# Patient Record
Sex: MALE | Race: WHITE | Employment: OTHER | URBAN - METROPOLITAN AREA
[De-identification: names, ages, dates, MRNs, and addresses within clinical notes are randomized per-mention and may not be internally consistent; named-entity substitution may affect disease eponyms.]

---

## 2017-01-01 ENCOUNTER — NURSING HOME VISIT (OUTPATIENT)
Dept: GERIATRICS | Facility: CLINIC | Age: 82
End: 2017-01-01
Payer: COMMERCIAL

## 2017-01-01 ENCOUNTER — TRANSFERRED RECORDS (OUTPATIENT)
Dept: HEALTH INFORMATION MANAGEMENT | Facility: CLINIC | Age: 82
End: 2017-01-01

## 2017-01-01 ENCOUNTER — HOSPITAL LABORATORY (OUTPATIENT)
Facility: OTHER | Age: 82
End: 2017-01-01

## 2017-01-01 ENCOUNTER — DOCUMENTATION ONLY (OUTPATIENT)
Dept: OTHER | Facility: CLINIC | Age: 82
End: 2017-01-01

## 2017-01-01 ENCOUNTER — CLINICAL UPDATE (OUTPATIENT)
Dept: PHARMACY | Facility: CLINIC | Age: 82
End: 2017-01-01

## 2017-01-01 VITALS
HEART RATE: 65 BPM | OXYGEN SATURATION: 95 % | TEMPERATURE: 97.4 F | RESPIRATION RATE: 18 BRPM | DIASTOLIC BLOOD PRESSURE: 56 MMHG | SYSTOLIC BLOOD PRESSURE: 98 MMHG | BODY MASS INDEX: 17.7 KG/M2 | WEIGHT: 113 LBS

## 2017-01-01 VITALS
RESPIRATION RATE: 16 BRPM | SYSTOLIC BLOOD PRESSURE: 114 MMHG | OXYGEN SATURATION: 97 % | TEMPERATURE: 98.4 F | BODY MASS INDEX: 17.42 KG/M2 | HEART RATE: 69 BPM | DIASTOLIC BLOOD PRESSURE: 66 MMHG | HEIGHT: 67 IN | WEIGHT: 111 LBS

## 2017-01-01 VITALS
WEIGHT: 114 LBS | OXYGEN SATURATION: 95 % | DIASTOLIC BLOOD PRESSURE: 62 MMHG | RESPIRATION RATE: 17 BRPM | TEMPERATURE: 97.2 F | HEIGHT: 67 IN | BODY MASS INDEX: 17.89 KG/M2 | HEART RATE: 66 BPM | SYSTOLIC BLOOD PRESSURE: 105 MMHG

## 2017-01-01 VITALS
HEART RATE: 76 BPM | SYSTOLIC BLOOD PRESSURE: 168 MMHG | BODY MASS INDEX: 18.05 KG/M2 | RESPIRATION RATE: 18 BRPM | HEIGHT: 67 IN | DIASTOLIC BLOOD PRESSURE: 75 MMHG | OXYGEN SATURATION: 95 % | TEMPERATURE: 98.6 F | WEIGHT: 115 LBS

## 2017-01-01 VITALS
HEART RATE: 74 BPM | WEIGHT: 113.8 LBS | TEMPERATURE: 97.6 F | BODY MASS INDEX: 17.82 KG/M2 | SYSTOLIC BLOOD PRESSURE: 124 MMHG | RESPIRATION RATE: 18 BRPM | DIASTOLIC BLOOD PRESSURE: 68 MMHG | OXYGEN SATURATION: 97 %

## 2017-01-01 VITALS
WEIGHT: 113 LBS | HEART RATE: 69 BPM | TEMPERATURE: 97.6 F | SYSTOLIC BLOOD PRESSURE: 124 MMHG | BODY MASS INDEX: 17.7 KG/M2 | RESPIRATION RATE: 18 BRPM | DIASTOLIC BLOOD PRESSURE: 68 MMHG | OXYGEN SATURATION: 97 %

## 2017-01-01 VITALS
BODY MASS INDEX: 26.06 KG/M2 | TEMPERATURE: 97.7 F | SYSTOLIC BLOOD PRESSURE: 114 MMHG | OXYGEN SATURATION: 95 % | DIASTOLIC BLOOD PRESSURE: 64 MMHG | RESPIRATION RATE: 17 BRPM | WEIGHT: 166 LBS | HEIGHT: 67 IN | HEART RATE: 74 BPM

## 2017-01-01 VITALS
BODY MASS INDEX: 16.95 KG/M2 | DIASTOLIC BLOOD PRESSURE: 73 MMHG | HEIGHT: 67 IN | HEART RATE: 66 BPM | WEIGHT: 108 LBS | SYSTOLIC BLOOD PRESSURE: 116 MMHG | OXYGEN SATURATION: 95 % | RESPIRATION RATE: 17 BRPM | TEMPERATURE: 98.7 F

## 2017-01-01 VITALS
HEART RATE: 70 BPM | BODY MASS INDEX: 17.85 KG/M2 | TEMPERATURE: 97 F | WEIGHT: 114 LBS | DIASTOLIC BLOOD PRESSURE: 65 MMHG | OXYGEN SATURATION: 95 % | RESPIRATION RATE: 18 BRPM | SYSTOLIC BLOOD PRESSURE: 117 MMHG

## 2017-01-01 VITALS
OXYGEN SATURATION: 95 % | HEART RATE: 64 BPM | SYSTOLIC BLOOD PRESSURE: 132 MMHG | DIASTOLIC BLOOD PRESSURE: 88 MMHG | BODY MASS INDEX: 17.27 KG/M2 | TEMPERATURE: 97.8 F | WEIGHT: 110 LBS | HEIGHT: 67 IN | RESPIRATION RATE: 20 BRPM

## 2017-01-01 VITALS
HEIGHT: 67 IN | WEIGHT: 112 LBS | RESPIRATION RATE: 13 BRPM | BODY MASS INDEX: 17.58 KG/M2 | HEART RATE: 63 BPM | TEMPERATURE: 96.5 F | SYSTOLIC BLOOD PRESSURE: 146 MMHG | DIASTOLIC BLOOD PRESSURE: 84 MMHG | OXYGEN SATURATION: 95 %

## 2017-01-01 VITALS
DIASTOLIC BLOOD PRESSURE: 68 MMHG | HEART RATE: 61 BPM | WEIGHT: 115 LBS | RESPIRATION RATE: 18 BRPM | OXYGEN SATURATION: 95 % | TEMPERATURE: 97.3 F | SYSTOLIC BLOOD PRESSURE: 132 MMHG | HEIGHT: 67 IN | BODY MASS INDEX: 18.05 KG/M2

## 2017-01-01 VITALS
DIASTOLIC BLOOD PRESSURE: 59 MMHG | WEIGHT: 111 LBS | HEART RATE: 79 BPM | TEMPERATURE: 96.3 F | OXYGEN SATURATION: 95 % | SYSTOLIC BLOOD PRESSURE: 96 MMHG | RESPIRATION RATE: 18 BRPM | BODY MASS INDEX: 17.39 KG/M2

## 2017-01-01 DIAGNOSIS — R54 FRAIL ELDERLY: ICD-10-CM

## 2017-01-01 DIAGNOSIS — I63.89 CEREBROVASCULAR ACCIDENT (CVA) DUE TO OTHER MECHANISM (H): Primary | ICD-10-CM

## 2017-01-01 DIAGNOSIS — G12.29 ANTERIOR HORN CELL DISEASE (H): ICD-10-CM

## 2017-01-01 DIAGNOSIS — G62.9 NEUROPATHY: ICD-10-CM

## 2017-01-01 DIAGNOSIS — G12.29 ANTERIOR HORN CELL DISEASE (H): Primary | ICD-10-CM

## 2017-01-01 DIAGNOSIS — R26.2 DIFFICULTY WALKING: ICD-10-CM

## 2017-01-01 DIAGNOSIS — R41.3 MEMORY LOSS: ICD-10-CM

## 2017-01-01 DIAGNOSIS — E46 MALNUTRITION, UNSPECIFIED TYPE (H): ICD-10-CM

## 2017-01-01 DIAGNOSIS — R54 FRAILTY: ICD-10-CM
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
## 2017-01-01 DIAGNOSIS — I10 ESSENTIAL HYPERTENSION: ICD-10-CM

## 2017-01-01 DIAGNOSIS — R52 PAIN: ICD-10-CM

## 2017-01-01 DIAGNOSIS — Z71.89 ADVANCED DIRECTIVES, COUNSELING/DISCUSSION: Chronic | ICD-10-CM

## 2017-01-01 DIAGNOSIS — E46 PROTEIN-CALORIE MALNUTRITION (H): ICD-10-CM

## 2017-01-01 DIAGNOSIS — M25.511 RIGHT SHOULDER PAIN, UNSPECIFIED CHRONICITY: ICD-10-CM

## 2017-01-01 DIAGNOSIS — T78.40XA ALLERGY, INITIAL ENCOUNTER: ICD-10-CM

## 2017-01-01 DIAGNOSIS — I63.89 CEREBROVASCULAR ACCIDENT (CVA) DUE TO OTHER MECHANISM (H): ICD-10-CM

## 2017-01-01 DIAGNOSIS — R13.10 DYSPHAGIA, UNSPECIFIED TYPE: ICD-10-CM

## 2017-01-01 DIAGNOSIS — J69.0 ASPIRATION PNEUMONIA OF RIGHT MIDDLE LOBE, UNSPECIFIED ASPIRATION PNEUMONIA TYPE (H): ICD-10-CM

## 2017-01-01 DIAGNOSIS — G81.90 HEMIPLEGIA (H): ICD-10-CM

## 2017-01-01 DIAGNOSIS — M79.601 RIGHT ARM PAIN: ICD-10-CM

## 2017-01-01 DIAGNOSIS — F32.89 OTHER DEPRESSION: ICD-10-CM

## 2017-01-01 DIAGNOSIS — G61.82 MULTIFOCAL MOTOR NEUROPATHY (H): ICD-10-CM

## 2017-01-01 DIAGNOSIS — I63.89 CEREBRAL INFARCTION DUE TO OTHER MECHANISM (H): ICD-10-CM

## 2017-01-01 DIAGNOSIS — M62.81 GENERALIZED MUSCLE WEAKNESS: ICD-10-CM

## 2017-01-01 LAB
ANION GAP SERPL CALCULATED.3IONS-SCNC: 7 MMOL/L (ref 3–14)
ANION GAP SERPL CALCULATED.3IONS-SCNC: 9 MMOL/L (ref 3–14)
BUN SERPL-MCNC: 11 MG/DL (ref 7–30)
BUN SERPL-MCNC: 9 MG/DL (ref 7–30)
CALCIUM SERPL-MCNC: 8.5 MG/DL (ref 8.5–10.1)
CALCIUM SERPL-MCNC: 9.4 MG/DL (ref 8.5–10.1)
CHLORIDE SERPL-SCNC: 103 MMOL/L (ref 94–109)
CHLORIDE SERPL-SCNC: 97 MMOL/L (ref 94–109)
CO2 SERPL-SCNC: 24 MMOL/L (ref 20–32)
CO2 SERPL-SCNC: 26 MMOL/L (ref 20–32)
CREAT SERPL-MCNC: 0.48 MG/DL (ref 0.66–1.25)
CREAT SERPL-MCNC: 0.51 MG/DL (ref 0.66–1.25)
GFR SERPL CREATININE-BSD FRML MDRD: >90 ML/MIN/1.7M2
GFR SERPL CREATININE-BSD FRML MDRD: ABNORMAL ML/MIN/1.7M2
GLUCOSE SERPL-MCNC: 84 MG/DL (ref 70–99)
GLUCOSE SERPL-MCNC: 91 MG/DL (ref 70–99)
HGB BLD-MCNC: 10.8 G/DL (ref 13.3–17.7)
HGB BLD-MCNC: 9.9 G/DL (ref 13.3–17.7)
POTASSIUM SERPL-SCNC: 4.3 MMOL/L (ref 3.4–5.3)
POTASSIUM SERPL-SCNC: 4.3 MMOL/L (ref 3.4–5.3)
SODIUM SERPL-SCNC: 130 MMOL/L (ref 133–144)
SODIUM SERPL-SCNC: 136 MMOL/L (ref 133–144)

## 2017-01-01 PROCEDURE — 99309 SBSQ NF CARE MODERATE MDM 30: CPT | Performed by: NURSE PRACTITIONER

## 2017-01-01 PROCEDURE — 99207 ZZC CDG-CORRECTLY CODED, REVIEWED AND AGREE: CPT | Performed by: NURSE PRACTITIONER

## 2017-01-01 PROCEDURE — 99310 SBSQ NF CARE HIGH MDM 45: CPT | Performed by: FAMILY MEDICINE

## 2017-01-01 PROCEDURE — 99207 ZZC CDG-CORRECTLY CODED, REVIEWED AND AGREE: CPT | Performed by: FAMILY MEDICINE

## 2017-01-01 PROCEDURE — 99318 ZZC ANNUAL NURSING FAC ASSESSMNT, STABLE: CPT | Performed by: NURSE PRACTITIONER

## 2017-01-01 RX ORDER — METOPROLOL SUCCINATE 50 MG/1
50 TABLET, EXTENDED RELEASE ORAL DAILY
Qty: 90 TABLET | Refills: 3 | Status: SHIPPED | OUTPATIENT
Start: 2017-01-01

## 2017-01-04 ENCOUNTER — HOSPITAL LABORATORY (OUTPATIENT)
Facility: OTHER | Age: 82
End: 2017-01-04

## 2017-01-04 LAB
ANION GAP SERPL CALCULATED.3IONS-SCNC: 6 MMOL/L (ref 3–14)
BUN SERPL-MCNC: 10 MG/DL (ref 7–30)
CALCIUM SERPL-MCNC: 8.9 MG/DL (ref 8.5–10.1)
CHLORIDE SERPL-SCNC: 101 MMOL/L (ref 94–109)
CO2 SERPL-SCNC: 27 MMOL/L (ref 20–32)
CREAT SERPL-MCNC: 0.51 MG/DL (ref 0.66–1.25)
GFR SERPL CREATININE-BSD FRML MDRD: ABNORMAL ML/MIN/1.7M2
GLUCOSE SERPL-MCNC: 91 MG/DL (ref 70–99)
HGB BLD-MCNC: 11.9 G/DL (ref 13.3–17.7)
POTASSIUM SERPL-SCNC: 4.5 MMOL/L (ref 3.4–5.3)
SODIUM SERPL-SCNC: 134 MMOL/L (ref 133–144)

## 2017-01-16 ENCOUNTER — NURSING HOME VISIT (OUTPATIENT)
Dept: GERIATRICS | Facility: CLINIC | Age: 82
End: 2017-01-16
Payer: COMMERCIAL

## 2017-01-16 VITALS
TEMPERATURE: 97 F | WEIGHT: 117 LBS | RESPIRATION RATE: 18 BRPM | SYSTOLIC BLOOD PRESSURE: 159 MMHG | DIASTOLIC BLOOD PRESSURE: 92 MMHG | HEART RATE: 72 BPM | BODY MASS INDEX: 18.32 KG/M2

## 2017-01-16 DIAGNOSIS — I63.9 CEREBROVASCULAR ACCIDENT (CVA), UNSPECIFIED MECHANISM (H): Primary | ICD-10-CM

## 2017-01-16 DIAGNOSIS — R54 FRAILTY: ICD-10-CM

## 2017-01-16 DIAGNOSIS — G12.20 MOTOR NEURON DISEASE (H): ICD-10-CM

## 2017-01-16 DIAGNOSIS — I10 BENIGN ESSENTIAL HYPERTENSION: ICD-10-CM

## 2017-01-16 PROCEDURE — 99310 SBSQ NF CARE HIGH MDM 45: CPT | Performed by: FAMILY MEDICINE

## 2017-01-16 PROCEDURE — 99207 ZZC CDG-CORRECTLY CODED, REVIEWED AND AGREE: CPT | Performed by: FAMILY MEDICINE

## 2017-01-16 NOTE — PROGRESS NOTES
[place] [facility]    Chief Complaint   Patient presents with     senior care Regulatory       HPI:    Jj Castro is a 87 year old  (1/16/1930), who is being seen today for a federally mandated E/M visit at [facility] . Today's concerns are:  Cerebrovascular accident (CVA), unspecified mechanism (H)  - with RUE weakness. Reports this happened 2.5 years ago. No improvement noted in the right hand strength.   - Uses WC for ambulation.   - Reportedly participates with therapy on prn basis    Benign essential hypertension  - No CP, HA or fainting      Motor neuron disease (H)  - With limited movements of extremities. Reportedly used to be followed by a neurologist, but now the focus is emphasis on comfort    Frailty  - Continues to require assistance. Appetite and sleep fine.       ALLERGIES: Review of patient's allergies indicates no known allergies.  PAST MEDICAL HISTORY:  has a past medical history of Other motor neuron diseases (1992); Other peripheral vascular disease(443.89) (H); Tobacco use disorder; At risk for falling (7/8/2013); Difficulty in walking(719.7) (7/8/2013); Generalized weakness (7/8/2013); Lack of appetite (7/8/2013); Annual physical exam  8/12/13 (8/12/2013); Intracranial bleed (H); CVA (cerebral infarction); Hypertension; Coronary artery disease; Neuropathy (H); Tobacco use disorder; and Anorexia. He also has no past medical history of Basal cell carcinoma, Malignant melanoma (H), or Squamous cell carcinoma (H).  PAST SURGICAL HISTORY:  has past surgical history that includes surgical history of -  and surgical history of -  (6/21/2000).  FAMILY HISTORY: family history includes Alcohol/Drug in his father; DIABETES in his maternal grandmother.  SOCIAL HISTORY:  reports that he has quit smoking. His smoking use included Cigarettes. He quit after 55.0 years of use. He has never used smokeless tobacco. He reports that he does not drink alcohol or use illicit  drugs.    MEDICATIONS:  Current Outpatient Prescriptions   Medication Sig Dispense Refill     fluticasone (FLONASE) 50 MCG/ACT nasal spray Spray 1 spray into both nostrils daily       triamcinolone (KENALOG) 0.1 % cream Apply topically 2 times daily as needed for irritation       menthol-zinc oxide (CALMOSEPTINE) 0.44-20.625 % OINT Apply topically as needed        SODIUM PHOSPHATES RE Place 1 enema rectally every 72 hours as needed If no BM and no results from suppository by 1200       ammonium lactate (LAC-HYDRIN) 12 % lotion Apply topically At Bedtime       hypromellose (NATURAL BALANCE TEARS) 0.4 % SOLN Place 1 drop into both eyes every hour as needed for dry eyes       bisacodyl (DULCOLAX) 10 MG suppository Place 10 mg rectally every 3 days If no BM       magnesium citrate solution Take 240 mLs by mouth See Admin Instructions       magnesium hydroxide (MILK OF MAGNESIA) 400 MG/5ML suspension Take 30 mLs by mouth daily as needed for constipation or heartburn       Metoprolol Succinate (TOPROL XL PO) Take 50 mg by mouth daily        loratadine (CLARITIN) 10 MG tablet Take 10 mg by mouth daily as needed        TraMADol HCl (ULTRAM PO) Take 25 mg by mouth every 8 hours as needed for moderate to severe pain And 25 mg po q hs.       Gabapentin (NEURONTIN PO) Take 200 mg by mouth 2 times daily        nystatin (MYCOSTATIN) cream Apply topically 2 times daily as needed        aspirin 81 MG tablet Take 1 tablet (81 mg) by mouth daily 30 tablet      sennosides (SENOKOT) 8.6 MG tablet Take 1 tablet by mouth 2 times daily Every other day and 1 tablet BID prn       hydrocortisone (TH HYDROCORTISONE) 1 % cream Apply topically 2 times daily        Acetaminophen (TYLENOL PO) Take 325 mg by mouth every 4 hours as needed for mild pain or fever       olopatadine (PATANOL) 0.1 % ophthalmic solution Place 1 drop into both eyes 2 times daily as needed.       Medications reviewed:  Medications reconciled to facility chart and changes  were made to reflect current medications as identified as above med list. Below are the changes that were made:   Medications stopped since last EPIC medication reconciliation:   Medications Discontinued During This Encounter   Medication Reason     MELATONIN PO Medication Reconciliation Clean Up     mineral oil OIL Medication Reconciliation Clean Up       Medications started since last EPIC medication reconciliation:  No orders of the defined types were placed in this encounter.     Case Management:  I have reviewed the care plan and MDS and do agree with the plan. Patient's desire to return to the community is not present.  Information reviewed:  Medications, vital signs, orders, and nursing notes.    ROS:  10 point ROS of systems including Constitutional, Eyes, Respiratory, Cardiovascular, Gastroenterology, Genitourinary, Integumentary, Muscularskeletal, Psychiatric were all negative except for pertinent positives noted in my HPI.    Exam:  /92 mmHg  Pulse 72  Temp(Src) 97  F (36.1  C)  Resp 18  Wt 117 lb (53.071 kg)  GENERAL APPEARANCE:  Alert, in no distress  ENT:  Mouth and posterior oropharynx normal, moist mucous membranes  EYES:  EOM, conjunctivae, lids, pupils and irises normal  NECK:  No adenopathy,masses or thyromegaly  RESP:  respiratory effort and palpation of chest normal, lungs clear to auscultation , no respiratory distress  CV:  Palpation and auscultation of heart done , regular rate and rhythm, no murmur, rub, or gallop, no edema  ABDOMEN:  normal bowel sounds, soft, nontender, no hepatosplenomegaly or other masses  M/S:   Gait and station abnormal Uses a walker for ambulation. No calf swelling or tendenress.   SKIN:  Inspection of skin and subcutaneous tissue baseline, Palpation of skin and subcutaneous tissue baseline  NEURO:   Cranial nerves 2-12 are normal tested and grossly at patient's baseline, Ms strenght: absent b/l dorsiflexion. 0/5 RUE, 4/5 RLE, 5/5 Left thigh hip flexors, no  purposeful movement in upper and lower extremities  PSYCH:  oriented X 3, normal insight, judgement and memory    Lab/Diagnostic data:      Last Basic Metabolic Panel:  NA      134   1/4/2017   POTASSIUM      4.5   1/4/2017  CHLORIDE      101   1/4/2017  PAULINE      8.9   1/4/2017  CO2       27   1/4/2017  BUN       10   1/4/2017  CR     0.51   1/4/2017  GLC       91   1/4/2017    WBC      8.6   12/10/2014  RBC     4.18   12/10/2014  HGB     11.9   1/4/2017  HCT     38.1   12/10/2014  MCV       91   12/10/2014  MCH     31.8   12/10/2014  MCHC     34.9   12/10/2014  RDW     12.9   12/10/2014  PLT      214   12/10/2014      ASSESSMENT/PLAN  Cerebrovascular accident (CVA), unspecified mechanism (H)  - with right sided weakness  - WC bound  -   Benign essential hypertension   BP Readings from Last 3 Encounters:   01/14/17 159/92   12/20/16 104/62   11/15/16 149/81   - On Toprol 50 mg daily.   - Keep SBP> 130 mmHg and DBP > 65 mmHg (levels below these increase mortality as shown by standard studies and observations).         Motor neuron disease (H)  - with absent dorsiflexion b/l. On care and comfort plan.   - On Neurontin 200 mg bid    Frailty  - Significant  Deficits requiring NH placement. Requiring extensive assistance from nursing.Up for meals only o/w spends the day resting in bed    RECOMMENDATIONS:  - DC Loratadine, and continue to monitor, if needed use it for 4 weeks then reassess.     Orders:  - no new orders.     Total time spent with patient visit was 35 min including patient visit and review of past records..    Electronically signed by:  Tereza Contreras

## 2017-02-14 ENCOUNTER — NURSING HOME VISIT (OUTPATIENT)
Dept: GERIATRICS | Facility: CLINIC | Age: 82
End: 2017-02-14
Payer: COMMERCIAL

## 2017-02-14 VITALS
BODY MASS INDEX: 18.25 KG/M2 | RESPIRATION RATE: 18 BRPM | SYSTOLIC BLOOD PRESSURE: 132 MMHG | WEIGHT: 116.5 LBS | DIASTOLIC BLOOD PRESSURE: 77 MMHG | TEMPERATURE: 97.4 F | HEART RATE: 66 BPM

## 2017-02-14 DIAGNOSIS — I10 BENIGN ESSENTIAL HYPERTENSION: ICD-10-CM

## 2017-02-14 DIAGNOSIS — R52 PAIN: ICD-10-CM

## 2017-02-14 DIAGNOSIS — M62.81 GENERALIZED MUSCLE WEAKNESS: ICD-10-CM

## 2017-02-14 DIAGNOSIS — G62.9 NEUROPATHY: ICD-10-CM

## 2017-02-14 DIAGNOSIS — G12.29 ANTERIOR HORN CELL DISEASE (H): ICD-10-CM

## 2017-02-14 DIAGNOSIS — I63.9 CEREBROVASCULAR ACCIDENT (CVA), UNSPECIFIED MECHANISM (H): Primary | ICD-10-CM

## 2017-02-14 PROCEDURE — 99309 SBSQ NF CARE MODERATE MDM 30: CPT | Performed by: NURSE PRACTITIONER

## 2017-02-14 RX ORDER — AMOXICILLIN 500 MG/1
2000 TABLET, FILM COATED ORAL PRN
COMMUNITY
End: 2017-01-01

## 2017-02-15 NOTE — PROGRESS NOTES
[place] [facility]    Chief Complaint   Patient presents with     Nursing Home Acute       HPI:    Jj Castro is a 86 year old  (1/16/1930), who is being seen today for an episodic care visit at [facility] . Today's concern is:    CVA   Essential hypertension  Significant deficits requiring NH placement  With physical and functional decline  Stable per chart and nursing  Patient had hx htn with hypertensive CVA Goal is to emphasize comfort with conservative treatment in NH     BP Range: 110-133/62-79  Generalized muscle weakness   Requiring extensive assistance from nursing  Up for meals only o/w spends the day resting in bed/chair  Participated with therapy   Anterior horn cell disease (H)  Limited movement of UE/LE, has been w/c bound for a number of years.  Previously followed by neurology now wants emphasis on comfort  Pain   Neuropathy   With limited LE sensation and ambulation   Managed with neurontin and tramadol     ALLERGIES: Review of patient's allergies indicates no known allergies.  Past Medical, Surgical, Family and Social History reviewed and updated in [facility].    Current Outpatient Prescriptions   Medication Sig Dispense Refill     amoxicillin (AMOXIL) 500 MG tablet Take 2,000 mg by mouth as needed Prior to dental procedure       fluticasone (FLONASE) 50 MCG/ACT nasal spray Spray 1 spray into both nostrils daily       triamcinolone (KENALOG) 0.1 % cream Apply topically 2 times daily as needed for irritation       menthol-zinc oxide (CALMOSEPTINE) 0.44-20.625 % OINT Apply topically as needed        SODIUM PHOSPHATES RE Place 1 enema rectally every 72 hours as needed If no BM and no results from suppository by 1200       ammonium lactate (LAC-HYDRIN) 12 % lotion Apply topically At Bedtime       hypromellose (NATURAL BALANCE TEARS) 0.4 % SOLN Place 1 drop into both eyes every hour as needed for dry eyes       bisacodyl (DULCOLAX) 10 MG suppository Place 10 mg rectally every 3  days If no BM       magnesium citrate solution Take 240 mLs by mouth See Admin Instructions       magnesium hydroxide (MILK OF MAGNESIA) 400 MG/5ML suspension Take 30 mLs by mouth daily as needed for constipation or heartburn       loratadine (CLARITIN) 10 MG tablet Take 10 mg by mouth daily as needed        TraMADol HCl (ULTRAM PO) Take 25 mg by mouth every 8 hours as needed for moderate to severe pain And 25 mg po q hs.       Gabapentin (NEURONTIN PO) Take 200 mg by mouth 2 times daily        nystatin (MYCOSTATIN) cream Apply topically 2 times daily as needed        aspirin 81 MG tablet Take 1 tablet (81 mg) by mouth daily 30 tablet      sennosides (SENOKOT) 8.6 MG tablet Take 1 tablet by mouth 2 times daily Every other day and 1 tablet BID prn       hydrocortisone (TH HYDROCORTISONE) 1 % cream Apply topically 2 times daily        Acetaminophen (TYLENOL PO) Take 325 mg by mouth every 4 hours as needed for mild pain or fever       olopatadine (PATANOL) 0.1 % ophthalmic solution Place 1 drop into both eyes 2 times daily as needed.       Medications reviewed:  Medications reconciled to facility chart and changes were made to reflect current medications as identified as above med list. Below are the changes that were made:   Medications stopped since last EPIC medication reconciliation:   There are no discontinued medications.    Medications started since last [facility] medication reconciliation:  No orders of the defined types were placed in this encounter.          REVIEW OF SYSTEMS:  Unobtainable secondary to cognitive impairment or aphasia. and 4 point ROS including Respiratory, CV, GI and , other than that noted in the HPI,  is negative    Physical Exam:  /77  Pulse 66  Temp 97.4  F (36.3  C)  Resp 18  Wt 116 lb 8 oz (52.8 kg)  BMI 18.25 kg/m2    GENERAL APPEARANCE:  Alert, in no distress, up in w/c smiling and friendly    RESP:  respiratory effort of chest normal, auscultation of lungs clear , no respiratory  distress  CV:   auscultation of heart done , rate and rhythm reg, no murmur, no peripheral edema  ABDOMEN:  normal bowel sounds, soft, nontender,  M/S:   Gait and station w/c, thin frail kyphotic right side weakness  SKIN:   pale w/d   PSYCH:  insight and judgement, memory impaired , affect and mood normal  Last Basic Metabolic Panel:  NA      132   7/6/2016   POTASSIUM      4.3   7/6/2016  CHLORIDE      101   7/6/2016  PAULINE      8.5   7/6/2016  CO2       24   7/6/2016  BUN       10   7/6/2016  CR     0.51   7/6/2016  GLC       85   7/6/2016  Hemoglobin   Date Value Ref Range Status   01/04/2017 11.9 (L) 13.3 - 17.7 g/dL Final       Assessment/Plan:     Cerebrovascular accident (CVA), unspecified mechanism (H)  Neuropathy (H)  Anterior horn cell disease (H)      Orders:  The current medical regimen is effective;  continue present plan and medications.      Time    Total time spent with patient visit was 25 min including patient visit and review of past records. Greater than 50% of total time spent with counseling and coordinating care.    Electronically signed by  TARUN Lino CNP

## 2017-02-15 NOTE — PROGRESS NOTES
[place] [facility]    Chief Complaint   Patient presents with     Nursing Home Acute       HPI:    Jj Castro is a 87 year old  (1/16/1930), who is being seen today for an episodic care visit at [facility] . Today's concern is:  {FGS DX:352383}    ALLERGIES: Review of patient's allergies indicates no known allergies.  Past Medical, Surgical, Family and Social History reviewed and updated in [facility].    Current Outpatient Prescriptions   Medication Sig Dispense Refill     amoxicillin (AMOXIL) 500 MG tablet Take 2,000 mg by mouth as needed Prior to dental procedure       fluticasone (FLONASE) 50 MCG/ACT nasal spray Spray 1 spray into both nostrils daily       triamcinolone (KENALOG) 0.1 % cream Apply topically 2 times daily as needed for irritation       menthol-zinc oxide (CALMOSEPTINE) 0.44-20.625 % OINT Apply topically as needed        SODIUM PHOSPHATES RE Place 1 enema rectally every 72 hours as needed If no BM and no results from suppository by 1200       ammonium lactate (LAC-HYDRIN) 12 % lotion Apply topically At Bedtime       hypromellose (NATURAL BALANCE TEARS) 0.4 % SOLN Place 1 drop into both eyes every hour as needed for dry eyes       bisacodyl (DULCOLAX) 10 MG suppository Place 10 mg rectally every 3 days If no BM       magnesium citrate solution Take 240 mLs by mouth See Admin Instructions       magnesium hydroxide (MILK OF MAGNESIA) 400 MG/5ML suspension Take 30 mLs by mouth daily as needed for constipation or heartburn       loratadine (CLARITIN) 10 MG tablet Take 10 mg by mouth daily as needed        TraMADol HCl (ULTRAM PO) Take 25 mg by mouth every 8 hours as needed for moderate to severe pain And 25 mg po q hs.       Gabapentin (NEURONTIN PO) Take 200 mg by mouth 2 times daily        nystatin (MYCOSTATIN) cream Apply topically 2 times daily as needed        aspirin 81 MG tablet Take 1 tablet (81 mg) by mouth daily 30 tablet      sennosides (SENOKOT) 8.6 MG tablet Take 1  tablet by mouth 2 times daily Every other day and 1 tablet BID prn       hydrocortisone (TH HYDROCORTISONE) 1 % cream Apply topically 2 times daily        Acetaminophen (TYLENOL PO) Take 325 mg by mouth every 4 hours as needed for mild pain or fever       olopatadine (PATANOL) 0.1 % ophthalmic solution Place 1 drop into both eyes 2 times daily as needed.       Medications reviewed:  Medications reconciled to facility chart and changes were made to reflect current medications as identified as above med list. Below are the changes that were made:   Medications stopped since last EPIC medication reconciliation:   Medications Discontinued During This Encounter   Medication Reason     Metoprolol Succinate (TOPROL XL PO) Medication Reconciliation Clean Up       Medications started since last [facility] medication reconciliation:  Orders Placed This Encounter   Medications     amoxicillin (AMOXIL) 500 MG tablet     Sig: Take 2,000 mg by mouth as needed Prior to dental procedure     ***    REVIEW OF SYSTEMS:  {ROS FGS:942691}    Physical Exam:  /77  Pulse 66  Temp 97.4  F (36.3  C)  Resp 18  Wt 116 lb 8 oz (52.8 kg)  BMI 18.25 kg/m2  {Nursing home physical exam :201268}    Recent Labs:      Last Basic Metabolic Panel:  Lab Results   Component Value Date     01/04/2017      Lab Results   Component Value Date    POTASSIUM 4.5 01/04/2017     Lab Results   Component Value Date    CHLORIDE 101 01/04/2017     Lab Results   Component Value Date    PAULINE 8.9 01/04/2017     Lab Results   Component Value Date    CO2 27 01/04/2017     Lab Results   Component Value Date    BUN 10 01/04/2017     Lab Results   Component Value Date    CR 0.51 01/04/2017     Lab Results   Component Value Date    GLC 91 01/04/2017       Lab Results   Component Value Date    WBC 8.6 12/10/2014     Lab Results   Component Value Date    RBC 4.18 12/10/2014     Lab Results   Component Value Date    HGB 11.9 01/04/2017     Lab Results   Component Value Date     HCT 38.1 12/10/2014     Lab Results   Component Value Date    MCV 91 12/10/2014     Lab Results   Component Value Date    MCH 31.8 12/10/2014     Lab Results   Component Value Date    MCHC 34.9 12/10/2014     Lab Results   Component Value Date    RDW 12.9 12/10/2014     Lab Results   Component Value Date     12/10/2014         Assessment/Plan:  {FGS DX:347697}    Orders:  ***    Time***    Electronically signed by  Tereza Contreras

## 2017-03-16 ENCOUNTER — NURSING HOME VISIT (OUTPATIENT)
Dept: GERIATRICS | Facility: CLINIC | Age: 82
End: 2017-03-16
Payer: COMMERCIAL

## 2017-03-16 VITALS
BODY MASS INDEX: 18.36 KG/M2 | RESPIRATION RATE: 16 BRPM | HEART RATE: 72 BPM | WEIGHT: 117.2 LBS | SYSTOLIC BLOOD PRESSURE: 156 MMHG | DIASTOLIC BLOOD PRESSURE: 97 MMHG | TEMPERATURE: 97.7 F

## 2017-03-16 DIAGNOSIS — M62.81 GENERALIZED MUSCLE WEAKNESS: ICD-10-CM

## 2017-03-16 DIAGNOSIS — G62.9 NEUROPATHY: ICD-10-CM

## 2017-03-16 DIAGNOSIS — G12.29 ANTERIOR HORN CELL DISEASE (H): ICD-10-CM

## 2017-03-16 DIAGNOSIS — I63.9 CEREBROVASCULAR ACCIDENT (CVA), UNSPECIFIED MECHANISM (H): Primary | ICD-10-CM

## 2017-03-16 PROCEDURE — 99309 SBSQ NF CARE MODERATE MDM 30: CPT | Performed by: NURSE PRACTITIONER

## 2017-03-16 NOTE — PROGRESS NOTES
[place] [facility]    Chief Complaint   Patient presents with     FCI Regulatory       HPI:    Jj Castro is a 85 year old  (1/16/1930), who is being seen today for a federally mandated E/M visit at [facility] . Today's concerns are:   Cerebrovascular accident   With physical and functional decline Significant deficits requiring NH placement including right side weakness, fatigue  Generalized muscle weakness  Anterior horn cell disease    Requiring extensive assistance from nursing  Up for meals only o/w spends the day resting in chair  Participates with therapy on prn basis  Contractures of wrists limit independence   W/c bound   Hx of recurring falls some with injury or fracture  No recent falls reported  Participates with therapy on prn basis  Neuropathy   On bid neurontin and tramadol  with improved comfort  Patient requests no changes  No reported adverse s/e     ALLERGIES: Review of patient's allergies indicates no known allergies.  PAST MEDICAL HISTORY:  has a past medical history of Annual physical exam  8/12/13 (8/12/2013); Anorexia; At risk for falling (7/8/2013); Coronary artery disease; CVA (cerebral infarction); Difficulty in walking(719.7) (7/8/2013); Generalized weakness (7/8/2013); Hypertension; Intracranial bleed (H); Lack of appetite (7/8/2013); Neuropathy (H); Other motor neuron diseases (1992); Other peripheral vascular disease(443.89) (H); Tobacco use disorder; and Tobacco use disorder. He also has no past medical history of Basal cell carcinoma; Malignant melanoma (H); or Squamous cell carcinoma (H).  PAST SURGICAL HISTORY:  has a past surgical history that includes surgical history of - and surgical history of - (6/21/2000).  FAMILY HISTORY: family history includes Alcohol/Drug in his father; DIABETES in his maternal grandmother.  SOCIAL HISTORY:  reports that he has quit smoking. His smoking use included Cigarettes. He quit after 55.00 years of use. He  has never used smokeless tobacco. He reports that he does not drink alcohol or use illicit drugs.    MEDICATIONS:  Current Outpatient Prescriptions   Medication Sig Dispense Refill     amoxicillin (AMOXIL) 500 MG tablet Take 2,000 mg by mouth as needed Prior to dental procedure       fluticasone (FLONASE) 50 MCG/ACT nasal spray Spray 1 spray into both nostrils daily       triamcinolone (KENALOG) 0.1 % cream Apply topically 2 times daily as needed for irritation       menthol-zinc oxide (CALMOSEPTINE) 0.44-20.625 % OINT Apply topically as needed        SODIUM PHOSPHATES RE Place 1 enema rectally every 72 hours as needed If no BM and no results from suppository by 1200       ammonium lactate (LAC-HYDRIN) 12 % lotion Apply topically At Bedtime       hypromellose (NATURAL BALANCE TEARS) 0.4 % SOLN Place 1 drop into both eyes every hour as needed for dry eyes       bisacodyl (DULCOLAX) 10 MG suppository Place 10 mg rectally every 3 days If no BM       magnesium citrate solution Take 240 mLs by mouth See Admin Instructions       magnesium hydroxide (MILK OF MAGNESIA) 400 MG/5ML suspension Take 30 mLs by mouth daily as needed for constipation or heartburn       loratadine (CLARITIN) 10 MG tablet Take 10 mg by mouth daily as needed        TraMADol HCl (ULTRAM PO) Take 25 mg by mouth every 8 hours as needed for moderate to severe pain And 25 mg po q hs.       Gabapentin (NEURONTIN PO) Take 200 mg by mouth 2 times daily        nystatin (MYCOSTATIN) cream Apply topically 2 times daily as needed        aspirin 81 MG tablet Take 1 tablet (81 mg) by mouth daily 30 tablet      sennosides (SENOKOT) 8.6 MG tablet Take 1 tablet by mouth 2 times daily Every other day and 1 tablet BID prn       hydrocortisone (TH HYDROCORTISONE) 1 % cream Apply topically 2 times daily        Acetaminophen (TYLENOL PO) Take 325 mg by mouth every 4 hours as needed for mild pain or fever And 325 mg every morning       olopatadine (PATANOL) 0.1 %  ophthalmic solution Place 1 drop into both eyes 2 times daily as needed.         Case Management:  I have reviewed the care plan and MDS and do agree with the plan. Patient's desire to return to the community is present, but is not able due to care needs .  Information reviewed:  Medications, vital signs, orders, and nursing notes.    ROS:  4 point ROS including Respiratory, CV, GI and , other than that noted in the HPI,  is negative    Exam:  BP (!) 156/97  Pulse 72  Temp 97.7  F (36.5  C)  Resp 16  Wt 117 lb 3.2 oz (53.2 kg)  BMI 18.36 kg/m2    GENERAL APPEARANCE:  Alert, in no distress, up in w/c smiling and friendly    RESP:  respiratory effort of chest normal, auscultation of lungs clear , no respiratory distress  CV:   auscultation of heart done , rate and rhythm reg, no murmur, no peripheral edema  ABDOMEN:  normal bowel sounds, soft, nontender,  M/S:   Gait and station w/c, thin frail kyphotic right side weakness  SKIN:   pale w/d   PSYCH:  insight and judgement, memory impaired , affect and mood normal    Lab/Diagnostic data:    Results for orders placed or performed in visit on 01/04/17   Basic metabolic panel   Result Value Ref Range    Sodium 134 133 - 144 mmol/L    Potassium 4.5 3.4 - 5.3 mmol/L    Chloride 101 94 - 109 mmol/L    Carbon Dioxide 27 20 - 32 mmol/L    Anion Gap 6 3 - 14 mmol/L    Glucose 91 70 - 99 mg/dL    Urea Nitrogen 10 7 - 30 mg/dL    Creatinine 0.51 (L) 0.66 - 1.25 mg/dL    GFR Estimate >90  Non  GFR Calc   >60 mL/min/1.7m2    GFR Estimate If Black >90   GFR Calc   >60 mL/min/1.7m2    Calcium 8.9 8.5 - 10.1 mg/dL   Hemoglobin   Result Value Ref Range    Hemoglobin 11.9 (L) 13.3 - 17.7 g/dL       ASSESSMENT/PLAN  1. Cerebrovascular accident (CVA), unspecified mechanism (H)    2. Neuropathy (H)    3. Anterior horn cell disease (H)    4. Generalized muscle weakness         Orders:  The current medical regimen is effective;  continue present plan and  medications.    Total time spent with patient visit was 25 min including patient visit and review of past records.Greater than 50% of total time spent with counseling and coordinating care.    Electronically signed by:  Mary Colorado NP

## 2017-03-16 NOTE — PROGRESS NOTES
[facility]    Chief Complaint   Patient presents with     jail Regulatory       HPI:    Jj Castro is a 87 year old  (1/16/1930), who is being seen today for a federally mandated E/M visit at [facility] . Today's concerns are:  {FGS DX:834607}    ALLERGIES: Review of patient's allergies indicates no known allergies.  PAST MEDICAL HISTORY:  has a past medical history of Annual physical exam  8/12/13 (8/12/2013); Anorexia; At risk for falling (7/8/2013); Coronary artery disease; CVA (cerebral infarction); Difficulty in walking(719.7) (7/8/2013); Generalized weakness (7/8/2013); Hypertension; Intracranial bleed (H); Lack of appetite (7/8/2013); Neuropathy (H); Other motor neuron diseases (1992); Other peripheral vascular disease(443.89) (H); Tobacco use disorder; and Tobacco use disorder. He also has no past medical history of Basal cell carcinoma; Malignant melanoma (H); or Squamous cell carcinoma (H).  PAST SURGICAL HISTORY:  has a past surgical history that includes surgical history of - and surgical history of - (6/21/2000).  FAMILY HISTORY: family history includes Alcohol/Drug in his father; DIABETES in his maternal grandmother.  SOCIAL HISTORY:  reports that he has quit smoking. His smoking use included Cigarettes. He quit after 55.00 years of use. He has never used smokeless tobacco. He reports that he does not drink alcohol or use illicit drugs.    MEDICATIONS:  Current Outpatient Prescriptions   Medication Sig Dispense Refill     amoxicillin (AMOXIL) 500 MG tablet Take 2,000 mg by mouth as needed Prior to dental procedure       fluticasone (FLONASE) 50 MCG/ACT nasal spray Spray 1 spray into both nostrils daily       triamcinolone (KENALOG) 0.1 % cream Apply topically 2 times daily as needed for irritation       menthol-zinc oxide (CALMOSEPTINE) 0.44-20.625 % OINT Apply topically as needed        SODIUM PHOSPHATES RE Place 1 enema rectally every 72 hours as  needed If no BM and no results from suppository by 1200       ammonium lactate (LAC-HYDRIN) 12 % lotion Apply topically At Bedtime       hypromellose (NATURAL BALANCE TEARS) 0.4 % SOLN Place 1 drop into both eyes every hour as needed for dry eyes       bisacodyl (DULCOLAX) 10 MG suppository Place 10 mg rectally every 3 days If no BM       magnesium citrate solution Take 240 mLs by mouth See Admin Instructions       magnesium hydroxide (MILK OF MAGNESIA) 400 MG/5ML suspension Take 30 mLs by mouth daily as needed for constipation or heartburn       loratadine (CLARITIN) 10 MG tablet Take 10 mg by mouth daily as needed        TraMADol HCl (ULTRAM PO) Take 25 mg by mouth every 8 hours as needed for moderate to severe pain And 25 mg po q hs.       Gabapentin (NEURONTIN PO) Take 200 mg by mouth 2 times daily        nystatin (MYCOSTATIN) cream Apply topically 2 times daily as needed        aspirin 81 MG tablet Take 1 tablet (81 mg) by mouth daily 30 tablet      sennosides (SENOKOT) 8.6 MG tablet Take 1 tablet by mouth 2 times daily Every other day and 1 tablet BID prn       hydrocortisone (TH HYDROCORTISONE) 1 % cream Apply topically 2 times daily        Acetaminophen (TYLENOL PO) Take 325 mg by mouth every 4 hours as needed for mild pain or fever And 325 mg every morning       olopatadine (PATANOL) 0.1 % ophthalmic solution Place 1 drop into both eyes 2 times daily as needed.       Medications reviewed:  Medications reconciled to facility chart and changes were made to reflect current medications as identified as above med list. Below are the changes that were made:   Medications stopped since last EPIC medication reconciliation:   There are no discontinued medications.    Medications started since last [facility] medication reconciliation:  No orders of the defined types were placed in this encounter.    ***    Case Management:  I have reviewed the care plan and MDS and do agree with the plan. Patient's desire to return to the  community is {FGS RETURN TO COMMUNITY:136365}.  Information reviewed:  Medications, vital signs, orders, and nursing notes.    ROS:  {ROS FGS:039693}    Exam:  BP (!) 156/97  Pulse 72  Temp 97.7  F (36.5  C)  Resp 16  Wt 117 lb 3.2 oz (53.2 kg)  BMI 18.36 kg/m2  {Nursing home physical exam :467812}    Lab/Diagnostic data:      Last Basic Metabolic Panel:  Lab Results   Component Value Date     01/04/2017      Lab Results   Component Value Date    POTASSIUM 4.5 01/04/2017     Lab Results   Component Value Date    CHLORIDE 101 01/04/2017     Lab Results   Component Value Date    PAULINE 8.9 01/04/2017     Lab Results   Component Value Date    CO2 27 01/04/2017     Lab Results   Component Value Date    BUN 10 01/04/2017     Lab Results   Component Value Date    CR 0.51 01/04/2017     Lab Results   Component Value Date    GLC 91 01/04/2017       Lab Results   Component Value Date    WBC 8.6 12/10/2014     Lab Results   Component Value Date    RBC 4.18 12/10/2014     Lab Results   Component Value Date    HGB 11.9 01/04/2017     Lab Results   Component Value Date    HCT 38.1 12/10/2014     Lab Results   Component Value Date    MCV 91 12/10/2014     Lab Results   Component Value Date    MCH 31.8 12/10/2014     Lab Results   Component Value Date    MCHC 34.9 12/10/2014     Lab Results   Component Value Date    RDW 12.9 12/10/2014     Lab Results   Component Value Date     12/10/2014         ASSESSMENT/PLAN  {FGS DX:461846}    Orders:  ***    Total time spent with patient visit was {1/2/3/4/5:350698} including patient visit and review of past records.Greater than 50% of total time spent with counseling and coordinating care.    Electronically signed by:  Tereza Contreras

## 2017-03-23 ENCOUNTER — DOCUMENTATION ONLY (OUTPATIENT)
Dept: OTHER | Facility: CLINIC | Age: 82
End: 2017-03-23

## 2017-03-23 DIAGNOSIS — Z71.89 ADVANCED DIRECTIVES, COUNSELING/DISCUSSION: Chronic | ICD-10-CM

## 2017-04-12 NOTE — PROGRESS NOTES
[facility]    Chief Complaint   Patient presents with     Nursing Home Acute       HPI:    Jj Castro is a 87 year old  (1/16/1930), who is being seen today for an episodic care visit at [facility] . Today's concern is:  {FGS DX:847927}    ALLERGIES: Review of patient's allergies indicates no known allergies.  Past Medical, Surgical, Family and Social History reviewed and updated in [facility].    Current Outpatient Prescriptions   Medication Sig Dispense Refill     amoxicillin (AMOXIL) 500 MG tablet Take 2,000 mg by mouth as needed Prior to dental procedure       fluticasone (FLONASE) 50 MCG/ACT nasal spray Spray 1 spray into both nostrils daily       triamcinolone (KENALOG) 0.1 % cream Apply topically 2 times daily as needed for irritation       menthol-zinc oxide (CALMOSEPTINE) 0.44-20.625 % OINT Apply topically as needed        SODIUM PHOSPHATES RE Place 1 enema rectally every 72 hours as needed If no BM and no results from suppository by 1200       ammonium lactate (LAC-HYDRIN) 12 % lotion Apply topically At Bedtime       hypromellose (NATURAL BALANCE TEARS) 0.4 % SOLN Place 1 drop into both eyes every hour as needed for dry eyes       bisacodyl (DULCOLAX) 10 MG suppository Place 10 mg rectally every 3 days If no BM       magnesium citrate solution Take 240 mLs by mouth See Admin Instructions       magnesium hydroxide (MILK OF MAGNESIA) 400 MG/5ML suspension Take 30 mLs by mouth daily as needed for constipation or heartburn       loratadine (CLARITIN) 10 MG tablet Take 10 mg by mouth daily as needed        TraMADol HCl (ULTRAM PO) Take 25 mg by mouth every 8 hours as needed for moderate to severe pain And 25 mg po q hs.       Gabapentin (NEURONTIN PO) Take 200 mg by mouth 2 times daily        nystatin (MYCOSTATIN) cream Apply topically 2 times daily as needed        aspirin 81 MG tablet Take 1 tablet (81 mg) by mouth daily 30 tablet      sennosides (SENOKOT) 8.6 MG tablet Take 1  "tablet by mouth 2 times daily Every other day and 1 tablet BID prn       hydrocortisone (TH HYDROCORTISONE) 1 % cream Apply topically 2 times daily        Acetaminophen (TYLENOL PO) Take 325 mg by mouth every 4 hours as needed for mild pain or fever And 325 mg every morning       olopatadine (PATANOL) 0.1 % ophthalmic solution Place 1 drop into both eyes 2 times daily as needed.       Medications reviewed:  Medications reconciled to facility chart and changes were made to reflect current medications as identified as above med list. Below are the changes that were made:   Medications stopped since last EPIC medication reconciliation:   There are no discontinued medications.    Medications started since last [facility] medication reconciliation:  No orders of the defined types were placed in this encounter.    ***    REVIEW OF SYSTEMS:  {ROS FGS:296814}    Physical Exam:  /66  Pulse 69  Temp 98.4  F (36.9  C)  Resp 16  Ht 5' 7\" (1.702 m)  Wt 111 lb (50.3 kg)  SpO2 97%  BMI 17.39 kg/m2  {Nursing home physical exam :428785}    Recent Labs:  ***    Assessment/Plan:  {FGS DX:165683}    Orders:  ***    Time***    Electronically signed by  Daya Wang CMA                  "

## 2017-04-18 NOTE — PROGRESS NOTES
[facility]    Chief Complaint   Patient presents with     Nursing Home Acute       HPI:    Jj Castro is a 86 year old  (1/16/1930), who is being seen today for an episodic care visit at [facility] . Today's concern is:    CVA   Essential hypertension  Significant deficits requiring NH placement  With physical and functional decline  Stable per chart and nursing  Patient had hx htn with hypertensive CVA.   Goal is to emphasize comfort with conservative treatment in NH     BP Range:  BP Readings from Last 3 Encounters:   04/12/17 114/66   03/14/17 (!) 156/97   02/11/17 132/77   Generalized muscle weakness   Requiring extensive assistance from nursing  Up for meals only o/w spends the day resting in bed/chair  Participated with therapy   Anterior horn cell disease (H)  Limited movement of UE/LE, has been w/c bound for a number of years.  Previously followed by neurology now wants emphasis on comfort  Pain   Neuropathy   With limited LE sensation and ambulation   Managed with neurontin and tramadol     ALLERGIES: Review of patient's allergies indicates no known allergies.  Past Medical, Surgical, Family and Social History reviewed and updated in [facility].    Current Outpatient Prescriptions   Medication Sig Dispense Refill     amoxicillin (AMOXIL) 500 MG tablet Take 2,000 mg by mouth as needed Prior to dental procedure       fluticasone (FLONASE) 50 MCG/ACT nasal spray Spray 1 spray into both nostrils daily       triamcinolone (KENALOG) 0.1 % cream Apply topically 2 times daily as needed for irritation       menthol-zinc oxide (CALMOSEPTINE) 0.44-20.625 % OINT Apply topically as needed        SODIUM PHOSPHATES RE Place 1 enema rectally every 72 hours as needed If no BM and no results from suppository by 1200       ammonium lactate (LAC-HYDRIN) 12 % lotion Apply topically At Bedtime       hypromellose (NATURAL BALANCE TEARS) 0.4 % SOLN Place 1 drop into both eyes every hour as needed  "for dry eyes       bisacodyl (DULCOLAX) 10 MG suppository Place 10 mg rectally every 3 days If no BM       magnesium citrate solution Take 240 mLs by mouth See Admin Instructions       magnesium hydroxide (MILK OF MAGNESIA) 400 MG/5ML suspension Take 30 mLs by mouth daily as needed for constipation or heartburn       loratadine (CLARITIN) 10 MG tablet Take 10 mg by mouth daily as needed        TraMADol HCl (ULTRAM PO) Take 25 mg by mouth every 8 hours as needed for moderate to severe pain And 25 mg po q hs.       Gabapentin (NEURONTIN PO) Take 200 mg by mouth 2 times daily        nystatin (MYCOSTATIN) cream Apply topically 2 times daily as needed        aspirin 81 MG tablet Take 1 tablet (81 mg) by mouth daily 30 tablet      sennosides (SENOKOT) 8.6 MG tablet Take 1 tablet by mouth 2 times daily Every other day and 1 tablet BID prn       hydrocortisone (TH HYDROCORTISONE) 1 % cream Apply topically 2 times daily        Acetaminophen (TYLENOL PO) Take 325 mg by mouth every 4 hours as needed for mild pain or fever And 325 mg every morning       olopatadine (PATANOL) 0.1 % ophthalmic solution Place 1 drop into both eyes 2 times daily as needed.       Medications reviewed:  Medications reconciled to facility chart and changes were made to reflect current medications as identified as above med list. Below are the changes that were made:   Medications stopped since last EPIC medication reconciliation:   There are no discontinued medications.    Medications started since last [facility] medication reconciliation:  No orders of the defined types were placed in this encounter.     REVIEW OF SYSTEMS:  Unobtainable secondary to cognitive impairment or aphasia. and 4 point ROS including Respiratory, CV, GI and , other than that noted in the HPI,  is negative    Physical Exam:  /66  Pulse 69  Temp 98.4  F (36.9  C)  Resp 16  Ht 5' 7\" (1.702 m)  Wt 111 lb (50.3 kg)  SpO2 97%  BMI 17.39 kg/m2    GENERAL APPEARANCE:  Alert, " in no distress, up in w/c smiling and friendly    RESP:  respiratory effort of chest normal, auscultation of lungs clear , no respiratory distress  CV:   auscultation of heart done , rate and rhythm reg, no murmur, no peripheral edema  ABDOMEN:  normal bowel sounds, soft, nontender,  M/S:   Gait and station w/c, thin frail kyphotic right side weakness  SKIN:   pale w/d   PSYCH:  insight and judgement, memory impaired , affect and mood normal     Results for orders placed or performed in visit on 01/04/17   Basic metabolic panel   Result Value Ref Range    Sodium 134 133 - 144 mmol/L    Potassium 4.5 3.4 - 5.3 mmol/L    Chloride 101 94 - 109 mmol/L    Carbon Dioxide 27 20 - 32 mmol/L    Anion Gap 6 3 - 14 mmol/L    Glucose 91 70 - 99 mg/dL    Urea Nitrogen 10 7 - 30 mg/dL    Creatinine 0.51 (L) 0.66 - 1.25 mg/dL    GFR Estimate >90  Non  GFR Calc   >60 mL/min/1.7m2    GFR Estimate If Black >90   GFR Calc   >60 mL/min/1.7m2    Calcium 8.9 8.5 - 10.1 mg/dL   Hemoglobin   Result Value Ref Range    Hemoglobin 11.9 (L) 13.3 - 17.7 g/dL   Assessment/Plan:     Anterior horn cell disease (H)  Neuropathy (H)  Cerebral infarction (H)  Essential hypertension      Orders:  The current medical regimen is effective;  continue present plan and medications.    Time    Total time spent with patient visit was 25 min including patient visit and review of past records. Greater than 50% of total time spent with counseling and coordinating care.    Electronically signed by  TARUN Lino CNP

## 2017-05-10 NOTE — PATIENT INSTRUCTIONS
Orders:  1.  XR lumbar spine, pelvis, and right rib.  2.  Continue Ultram 25 mg po qd and add q 3 hours prn.

## 2017-05-10 NOTE — PROGRESS NOTES
[place] [facility]    Chief Complaint   Patient presents with     Nursing Home Acute       HPI:    Jj Castro is a 87 year old  (1/16/1930), who is being seen today for an episodic care visit at [facility] . Today's concern is:  Here to see patient at request of nursing and patient   Cerebrovascular accident (CVA) due to other mechanism (H)   Hemiplegia (H)  Fall, subsequent encounter  Pain  Patient with hx of hypertensive CVA with hemiplegia, difficulty walking and increased weakness now with reported fall and increased pain in right side from rib area to hip.  Unwitnessed but found on floor sitting next to w/c.  Patient c/o increased weakness on right side in addition to pain.  Patient upset and concerned about fall and possible injury.  Discussed with nursing      ALLERGIES: Review of patient's allergies indicates no known allergies.  Past Medical, Surgical, Family and Social History reviewed and updated in [facility]..    Current Outpatient Prescriptions   Medication Sig Dispense Refill     TraMADol HCl (ULTRAM PO) Take 25 mg by mouth every 8 hours as needed for moderate to severe pain And 25 mg po q hs.  And 25 mg po q 3 hours prn       amoxicillin (AMOXIL) 500 MG tablet Take 2,000 mg by mouth as needed Prior to dental procedure       fluticasone (FLONASE) 50 MCG/ACT nasal spray Spray 1 spray into both nostrils daily       triamcinolone (KENALOG) 0.1 % cream Apply topically 2 times daily as needed for irritation       menthol-zinc oxide (CALMOSEPTINE) 0.44-20.625 % OINT Apply topically as needed        SODIUM PHOSPHATES RE Place 1 enema rectally every 72 hours as needed If no BM and no results from suppository by 1200       ammonium lactate (LAC-HYDRIN) 12 % lotion Apply topically At Bedtime       hypromellose (NATURAL BALANCE TEARS) 0.4 % SOLN Place 1 drop into both eyes every hour as needed for dry eyes       bisacodyl (DULCOLAX) 10 MG suppository Place 10 mg rectally every 3 days If  no BM       magnesium citrate solution Take 240 mLs by mouth See Admin Instructions       magnesium hydroxide (MILK OF MAGNESIA) 400 MG/5ML suspension Take 30 mLs by mouth daily as needed for constipation or heartburn       loratadine (CLARITIN) 10 MG tablet Take 10 mg by mouth daily as needed        Gabapentin (NEURONTIN PO) Take 200 mg by mouth 2 times daily        nystatin (MYCOSTATIN) cream Apply topically 2 times daily as needed        aspirin 81 MG tablet Take 1 tablet (81 mg) by mouth daily 30 tablet      sennosides (SENOKOT) 8.6 MG tablet Take 1 tablet by mouth 2 times daily Every other day and 1 tablet BID prn       hydrocortisone (TH HYDROCORTISONE) 1 % cream Apply topically 2 times daily        Acetaminophen (TYLENOL PO) Take 325 mg by mouth every 4 hours as needed for mild pain or fever And 325 mg every morning       olopatadine (PATANOL) 0.1 % ophthalmic solution Place 1 drop into both eyes 2 times daily as needed.       Medications reviewed:  Medications reconciled to facility chart and changes were made to reflect current medications as identified as above med list. Below are the changes that were made:   Medications stopped since last EPIC medication reconciliation:   There are no discontinued medications.    Medications started since last [facility] medication reconciliation:  No orders of the defined types were placed in this encounter.         REVIEW OF SYSTEMS:  4 point ROS including Respiratory, CV, GI and , other than that noted in the HPI,  is negative    Physical Exam:  /68  Pulse 69  Temp 97.6  F (36.4  C)  Resp 18  Wt 113 lb (51.3 kg)  SpO2 97%  BMI 17.7 kg/m2  GENERAL APPEARANCE:  Alert, in no distress, up in w/c smiling and friendly    RESP:  respiratory effort of chest normal, auscultation of lungs clear , no respiratory distress  CV:   auscultation of heart done , rate and rhythm reg, no murmur, no peripheral edema  ABDOMEN:  normal bowel sounds, soft, nontender,  M/S:   Gait  and station w/c, thin frail kyphotic right side weakness  SKIN:   pale w/d   PSYCH:  insight and judgement, memory impaired , affect and mood normal     Recent Labs:    Results for orders placed or performed in visit on 04/24/17   Basic metabolic panel   Result Value Ref Range    Sodium 136 133 - 144 mmol/L    Potassium 4.3 3.4 - 5.3 mmol/L    Chloride 103 94 - 109 mmol/L    Carbon Dioxide 24 20 - 32 mmol/L    Anion Gap 9 3 - 14 mmol/L    Glucose 91 70 - 99 mg/dL    Urea Nitrogen 11 7 - 30 mg/dL    Creatinine 0.51 (L) 0.66 - 1.25 mg/dL    GFR Estimate >90  Non  GFR Calc   >60 mL/min/1.7m2    GFR Estimate If Black >90   GFR Calc   >60 mL/min/1.7m2    Calcium 8.5 8.5 - 10.1 mg/dL   Hemoglobin   Result Value Ref Range    Hemoglobin 10.8 (L) 13.3 - 17.7 g/dL         Assessment/Plan:     Cerebrovascular accident (CVA) due to other mechanism (H)  Hemiplegia (H)  Fall, subsequent encounter  Pain      Orders:  1.  XR lumbar spine, pelvis, and right rib.  2.  Continue Ultram 25 mg po qd and add q 3 hours prn.  Continue to monitor     Time  Total time spent with patient visit was 25 min including patient visit and review of past records. Greater than 50% of total time spent with counseling and coordinating care .     Electronically signed by  TARUN Lino CNP

## 2017-05-11 NOTE — PROGRESS NOTES
[place] [facility]    Chief Complaint   Patient presents with     custodial Regulatory       HPI:   Obtained from the patient, medical record and from the [facility] staffs.     Jj Castro is a 87 year old  (1/16/1930), who is being seen today for a federally mandated E/M visit at [facility] . Today's concerns are:  Cerebrovascular accident (CVA) due to other mechanism (H)  Hemiplegia (H)  - Uses WC for ambulation.   - Reportedly participates with therapy on prn basis  - No change in the weakness arm.     Essential hypertension  - No CP, HA or fainting    Frailty  - Continues to require assistance with ADLs    Protein Calorie Malnutrition:  - Body mass index is 17.82 kg/(m^2).   - Appetite and sleep fine.    Neuropathy (H)  - no new issue.   ____________________________  Past Medical, social, family histories, medications, and allergies reviewed and updated    MEDICATIONS:  Current Outpatient Prescriptions   Medication Sig Dispense Refill     amoxicillin (AMOXIL) 500 MG tablet Take 2,000 mg by mouth as needed Prior to dental procedure       fluticasone (FLONASE) 50 MCG/ACT nasal spray Spray 1 spray into both nostrils daily       triamcinolone (KENALOG) 0.1 % cream Apply topically 2 times daily as needed for irritation       menthol-zinc oxide (CALMOSEPTINE) 0.44-20.625 % OINT Apply topically as needed        SODIUM PHOSPHATES RE Place 1 enema rectally every 72 hours as needed If no BM and no results from suppository by 1200       ammonium lactate (LAC-HYDRIN) 12 % lotion Apply topically At Bedtime       hypromellose (NATURAL BALANCE TEARS) 0.4 % SOLN Place 1 drop into both eyes every hour as needed for dry eyes       bisacodyl (DULCOLAX) 10 MG suppository Place 10 mg rectally every 3 days If no BM       magnesium citrate solution Take 240 mLs by mouth See Admin Instructions       magnesium hydroxide (MILK OF MAGNESIA) 400 MG/5ML suspension Take 30 mLs by mouth daily as needed for  constipation or heartburn       loratadine (CLARITIN) 10 MG tablet Take 10 mg by mouth daily as needed        TraMADol HCl (ULTRAM PO) Take 25 mg by mouth every 8 hours as needed for moderate to severe pain And 25 mg po q hs.  And 25 mg po q 3 hours prn       Gabapentin (NEURONTIN PO) Take 200 mg by mouth 2 times daily        nystatin (MYCOSTATIN) cream Apply topically 2 times daily as needed        aspirin 81 MG tablet Take 1 tablet (81 mg) by mouth daily 30 tablet      sennosides (SENOKOT) 8.6 MG tablet Take 1 tablet by mouth 2 times daily Every other day and 1 tablet BID prn       hydrocortisone (TH HYDROCORTISONE) 1 % cream Apply topically 2 times daily        Acetaminophen (TYLENOL PO) Take 325 mg by mouth every 4 hours as needed for mild pain or fever And 325 mg every morning       olopatadine (PATANOL) 0.1 % ophthalmic solution Place 1 drop into both eyes 2 times daily as needed.       Medications reviewed: in the chart and EHR.     Case Management:  I have reviewed the care plan and MDS and do agree with the plan. Patient's desire to return to the community is not present.  Information reviewed:  Medications, vital signs, orders, and nursing notes.    ROS:  10 point ROS of systems including Constitutional, Eyes, Respiratory, Cardiovascular, Gastroenterology, Genitourinary, Integumentary, Muscularskeletal, Psychiatric were all negative except for pertinent positives noted in my HPI    Exam:  Vitals: /68  Pulse 74  Temp 97.6  F (36.4  C)  Resp 18  Wt 113 lb 12.8 oz (51.6 kg)  SpO2 97%  BMI 17.82 kg/m2  BMI= Body mass index is 17.82 kg/(m^2).  GENERAL APPEARANCE: Alert, in no distress  ENT: Mouth and posterior oropharynx normal, moist mucous membranes  EYES: EOM, conjunctivae, lids, pupils and irises normal  NECK: No adenopathy,masses or thyromegaly  RESP: respiratory effort and palpation of chest normal, lungs clear to auscultation , no respiratory distress  CV: Palpation and auscultation of heart  done , regular rate and rhythm, no murmur, rub, or gallop, no edema  ABDOMEN: normal bowel sounds, soft, nontender, no hepatosplenomegaly or other masses  M/S: Gait and station abnormal Uses a walker for ambulation. No calf swelling or tenderness.   SKIN: Inspection of skin and subcutaneous tissue baseline, Palpation of skin and subcutaneous tissue baseline  NEURO: Cranial nerves 2-12 are normal tested and grossly at patient's baseline, Ms strength: absent b/l dorsiflexion. 1/5 RUE, 4/5 RLE, 4/4 Left thigh hip flexors and LUE, no purposeful movement in upper and lower extremities  PSYCH: oriented X 3, normal insight, judgement and memory    Lab/Diagnostic data:    Results for orders placed or performed in visit on 04/24/17   Basic metabolic panel   Result Value Ref Range    Sodium 136 133 - 144 mmol/L    Potassium 4.3 3.4 - 5.3 mmol/L    Chloride 103 94 - 109 mmol/L    Carbon Dioxide 24 20 - 32 mmol/L    Anion Gap 9 3 - 14 mmol/L    Glucose 91 70 - 99 mg/dL    Urea Nitrogen 11 7 - 30 mg/dL    Creatinine 0.51 (L) 0.66 - 1.25 mg/dL    GFR Estimate >90  Non  GFR Calc   >60 mL/min/1.7m2    GFR Estimate If Black >90   GFR Calc   >60 mL/min/1.7m2    Calcium 8.5 8.5 - 10.1 mg/dL   Hemoglobin   Result Value Ref Range    Hemoglobin 10.8 (L) 13.3 - 17.7 g/dL         ASSESSMENT/PLAN  Cerebrovascular accident (CVA) due to other mechanism (H)  Hemiplegia (H)  - right sided weakness, also weakness in other limbs.   - WC bound. Self propel-  Backward fashion.  - on PT/OT prn.     Essential hypertension  BP Readings from Last 3 Encounters:   05/11/17 124/68   05/10/17 124/68   04/12/17 114/66   - metoprolol discontinued.   - Diet controlled.     Frailty  - Continues to require assistance with ADLs      Protein-calorie malnutrition (H)  - Body mass index is 17.82 kg/(m^2).   - Partly could be due to Sarcopenia  - May benefit from nutrition supplement.   - Keep BMI b/w 25-35      Neuropathy (H)  - with  absent dorsiflexion b/l. On care and comfort plan.   - On Neurontin 200 mg bid      Orders:  - The current medical regimen is effective;  continue present plan and medications.    Total time spent with patient visit at the skilled nursing facility was 35 min including patient visit, discuss with GNP and nursing staffs, review medical records since the Writer's last visit, and labs results.     Electronically signed by:  Yuliana Feliz MD

## 2017-05-15 PROBLEM — E46 PROTEIN-CALORIE MALNUTRITION (H): Status: ACTIVE | Noted: 2017-01-01

## 2017-05-31 NOTE — PROGRESS NOTES
[place] [facility]    Chief Complaint   Patient presents with     Nursing Home Acute       HPI:    Jj Castro is a 87 year old  (1/16/1930), who is being seen today for an episodic care visit at [facility] .  HPI information obtained from: facility chart records, facility staff, patient report and [facility] chart review.Today's concern is:  Cerebrovascular accident (CVA) due to other mechanism (H)  Aspiration pneumonia (H)  Multifocal motor neuropathy (H)  Dysphagia  Patient with acute weakness, swallow problem with aspiration of food, fluids followed by repeated choking/coughing ut of food particles and large amounts of mucous.  No fever, no N/V or SOB/CP.  Sats > 90%  VSS. Goal is to emphasize comfort with conservative treatment in NH which patient confirmed is his wish.  Discussed options with staff and patient.      ALLERGIES: Review of patient's allergies indicates no known allergies.  Past Medical, Surgical, Family and Social History reviewed and updated in [facility].    Current Outpatient Prescriptions   Medication Sig Dispense Refill     amoxicillin (AMOXIL) 500 MG tablet Take 2,000 mg by mouth as needed Prior to dental procedure       fluticasone (FLONASE) 50 MCG/ACT nasal spray Spray 1 spray into both nostrils daily       triamcinolone (KENALOG) 0.1 % cream Apply topically 2 times daily as needed for irritation       menthol-zinc oxide (CALMOSEPTINE) 0.44-20.625 % OINT Apply topically as needed        SODIUM PHOSPHATES RE Place 1 enema rectally every 72 hours as needed If no BM and no results from suppository by 1200       ammonium lactate (LAC-HYDRIN) 12 % lotion Apply topically At Bedtime       hypromellose (NATURAL BALANCE TEARS) 0.4 % SOLN Place 1 drop into both eyes every hour as needed for dry eyes       bisacodyl (DULCOLAX) 10 MG suppository Place 10 mg rectally every 3 days If no BM       magnesium citrate solution Take 240 mLs by mouth See Admin Instructions        magnesium hydroxide (MILK OF MAGNESIA) 400 MG/5ML suspension Take 30 mLs by mouth daily as needed for constipation or heartburn       loratadine (CLARITIN) 10 MG tablet Take 10 mg by mouth daily as needed        TraMADol HCl (ULTRAM PO) Take 25 mg by mouth every 8 hours as needed for moderate to severe pain And 25 mg po q hs.  And 25 mg po q 3 hours prn       Gabapentin (NEURONTIN PO) Take 200 mg by mouth 2 times daily        nystatin (MYCOSTATIN) cream Apply topically 2 times daily as needed        aspirin 81 MG tablet Take 1 tablet (81 mg) by mouth daily 30 tablet      sennosides (SENOKOT) 8.6 MG tablet Take 1 tablet by mouth 2 times daily Every other day and 1 tablet BID prn       hydrocortisone (TH HYDROCORTISONE) 1 % cream Apply topically 2 times daily        Acetaminophen (TYLENOL PO) Take 325 mg by mouth every 4 hours as needed for mild pain or fever And 325 mg every morning       olopatadine (PATANOL) 0.1 % ophthalmic solution Place 1 drop into both eyes 2 times daily as needed.       Medications reviewed:  Medications reconciled to facility chart and changes were made to reflect current medications as identified as above med list. Below are the changes that were made:   Medications stopped since last EPIC medication reconciliation:   There are no discontinued medications.    Medications started since last [facility] medication reconciliation:  No orders of the defined types were placed in this encounter.  REVIEW OF SYSTEMS:  10 point ROS of systems including Constitutional, Eyes, Respiratory, Cardiovascular, Gastroenterology, Genitourinary, Integumentary, Muscularskeletal, Psychiatric were all negative except for pertinent positives noted in my HPI.    Physical Exam:  BP 98/56  Pulse 65  Temp 97.4  F (36.3  C)  Resp 18  Wt 113 lb (51.3 kg)  SpO2 95%  BMI 17.7 kg/m2  GENERAL APPEARANCE:  Alert, in no distress, up in w/c smiling and friendly    RESP:  respiratory effort of chest normal, auscultation of lungs  clear , no respiratory distress  CV:   auscultation of heart done , rate and rhythm reg, no murmur, no peripheral edema  ABDOMEN:  normal bowel sounds, soft, nontender,  M/S:   Gait and station w/c, thin frail kyphotic right side weakness, wrist and hand contractures    SKIN:   pale w/d   PSYCH:  insight and judgement, memory impaired , affect and mood normal     Recent Labs:  CBC RESULTS:   Recent Labs   Lab Test  04/24/17   0700  01/04/17   0641   12/10/14   0411  12/09/14   1604   WBC   --    --    --   8.6  6.9   RBC   --    --    --   4.18*  4.59   HGB  10.8*  11.9*   < >  13.3  14.5   HCT   --    --    --   38.1*  42.1   MCV   --    --    --   91  92   MCH   --    --    --   31.8  31.6   MCHC   --    --    --   34.9  34.4   RDW   --    --    --   12.9  12.3   PLT   --    --    --   214  267    < > = values in this interval not displayed.       Last Basic Metabolic Panel:  Recent Labs   Lab Test  04/24/17   0700  01/04/17   0641   NA  136  134   POTASSIUM  4.3  4.5   CHLORIDE  103  101   PAULINE  8.5  8.9   CO2  24  27   BUN  11  10   CR  0.51*  0.51*   GLC  91  91       No results found for: TSH]    Lab Results   Component Value Date    A1C 5.5 12/10/2014     Assessment/Plan:     Cerebrovascular accident (CVA) due to other mechanism (H)  Aspiration pneumonia (H)  Multifocal motor neuropathy (H)  Dysphagia      Orders:  1.  CXR.tomorrow    2.  Treat for comfort.   3.  ST Eval.      Total time spent with patient visit was 25 min including patient visit and review of past records. Greater than 50% of total time spent with counseling and coordinating care .       Electronically signed by  TARUN Lino CNP

## 2017-06-08 NOTE — PROGRESS NOTES
"[place] [facility]    Chief Complaint   Patient presents with     Nursing Home Acute       HPI:    Jj Castro is a 87 year old  (1/16/1930), who is being seen today for an episodic care visit at [facility] .  HPI information obtained from: facility chart records, facility staff, patient report and [facility] chart review.Today's concern is:  Cerebrovascular accident (CVA) due to other mechanism (H)  Recent episode of weakness, hypertension and lethargy.  Goal is to emphasize comfort with conservative treatment in NH  Patient states \"I think I'm ok now\"  Nursing reports patient appears back to baseline    Dysphagia  Recent problem with suspected aspiration.  Nursing follows VS and po intake    Allergy, initial encounter  Patient with long hx of allergies now with c/o burning itching eyes    ALLERGIES: Review of patient's allergies indicates no known allergies.  Past Medical, Surgical, Family and Social History reviewed and updated in [facility].    Current Outpatient Prescriptions   Medication Sig Dispense Refill     amoxicillin (AMOXIL) 500 MG tablet Take 2,000 mg by mouth as needed Prior to dental procedure       fluticasone (FLONASE) 50 MCG/ACT nasal spray Spray 1 spray into both nostrils daily       triamcinolone (KENALOG) 0.1 % cream Apply topically 2 times daily as needed for irritation       menthol-zinc oxide (CALMOSEPTINE) 0.44-20.625 % OINT Apply topically as needed        SODIUM PHOSPHATES RE Place 1 enema rectally every 72 hours as needed If no BM and no results from suppository by 1200       ammonium lactate (LAC-HYDRIN) 12 % lotion Apply topically At Bedtime       hypromellose (NATURAL BALANCE TEARS) 0.4 % SOLN Place 1 drop into both eyes every hour as needed for dry eyes       bisacodyl (DULCOLAX) 10 MG suppository Place 10 mg rectally every 3 days If no BM       magnesium citrate solution Take 240 mLs by mouth See Admin Instructions       magnesium hydroxide (MILK OF " "MAGNESIA) 400 MG/5ML suspension Take 30 mLs by mouth daily as needed for constipation or heartburn       loratadine (CLARITIN) 10 MG tablet Take 10 mg by mouth daily as needed        TraMADol HCl (ULTRAM PO) Take 25 mg by mouth every 8 hours as needed for moderate to severe pain And 25 mg po q hs.  And 25 mg po q 3 hours prn       Gabapentin (NEURONTIN PO) Take 200 mg by mouth 2 times daily        nystatin (MYCOSTATIN) cream Apply topically 2 times daily as needed        aspirin 81 MG tablet Take 1 tablet (81 mg) by mouth daily 30 tablet      sennosides (SENOKOT) 8.6 MG tablet Take 1 tablet by mouth 2 times daily Every other day and 1 tablet BID prn       hydrocortisone (TH HYDROCORTISONE) 1 % cream Apply topically 2 times daily        Acetaminophen (TYLENOL PO) Take 325 mg by mouth every 4 hours as needed for mild pain or fever And 325 mg every morning       olopatadine (PATANOL) 0.1 % ophthalmic solution Place 1 drop into both eyes 2 times daily as needed.       Medications reviewed:  Medications reconciled to facility chart and changes were made to reflect current medications as identified as above med list. Below are the changes that were made:   Medications stopped since last EPIC medication reconciliation:   There are no discontinued medications.    Medications started since last [facility] medication reconciliation:  No orders of the defined types were placed in this encounter.       REVIEW OF SYSTEMS:  4 point ROS including Respiratory, CV, GI and , other than that noted in the HPI,  is negative    Physical Exam:  /84  Pulse 63  Temp 96.5  F (35.8  C)  Resp 13  Ht 5' 7\" (1.702 m)  Wt 112 lb (50.8 kg)  SpO2 95%  BMI 17.54 kg/m2  GENERAL APPEARANCE:  Alert, in no distress, up in w/c smiling and friendly    RESP:  respiratory effort of chest normal, auscultation of lungs clear , no respiratory distress  CV:   auscultation of heart done , rate and rhythm reg, no murmur, no peripheral edema  ABDOMEN:  " normal bowel sounds, soft, nontender,  M/S:   Gait and station w/c, thin frail kyphotic right side weakness, wrist and hand contractures    SKIN:   pale w/d   PSYCH:  insight and judgement, memory impaired , affect and mood normal       Recent Labs:  CBC RESULTS:   Recent Labs   Lab Test  04/24/17   0700  01/04/17   0641   12/10/14   0411  12/09/14   1604   WBC   --    --    --   8.6  6.9   RBC   --    --    --   4.18*  4.59   HGB  10.8*  11.9*   < >  13.3  14.5   HCT   --    --    --   38.1*  42.1   MCV   --    --    --   91  92   MCH   --    --    --   31.8  31.6   MCHC   --    --    --   34.9  34.4   RDW   --    --    --   12.9  12.3   PLT   --    --    --   214  267    < > = values in this interval not displayed.       Last Basic Metabolic Panel:  Recent Labs   Lab Test  04/24/17   0700  01/04/17   0641   NA  136  134   POTASSIUM  4.3  4.5   CHLORIDE  103  101   PAULINE  8.5  8.9   CO2  24  27   BUN  11  10   CR  0.51*  0.51*   GLC  91  91       Liver Function Studies -   Recent Labs   Lab Test  05/15/13   1500   PROTTOTAL  7.6   ALBUMIN  4.2   BILITOTAL  0.6   ALKPHOS  111   AST  25   ALT  23       No results found for: TSH]    Lab Results   Component Value Date    A1C 5.5 12/10/2014           Assessment/Plan:     Cerebrovascular accident (CVA) due to other mechanism (H)  Dysphagia, unspecified type  Allergy, initial encounter      Orders:  1.  Patanol 1% ou bid x 5 days, then prn dosing.  2 1/2 strength baby shampoo scrubs q am x 7 days      Total time spent with patient visit was 25 min including patient visit and review of past records. Greater than 50% of total time spent with counseling and coordinating care.       Electronically signed by  TARUN Lino CNP

## 2017-07-12 NOTE — PROGRESS NOTES
[place] [facility]    Chief Complaint   Patient presents with     jail Regulatory       HPI:    Jj Castro is a 87 year old  (1/16/1930), who is being seen today for a federally mandated E/M visit at [facility] .  HPI information obtained from: facility chart records, facility staff, patient report and [place] Epic chart review. Today's concerns are:  Anterior horn cell disease (H)  Significant deficits requiring NH placement  Requiring extensive assistance from nursing  Up for meals only o/w spends the day resting in chair  Patient previously lived independently but UE contracture and LE weakness required SNF.   Patient requests conservative measures   Goal is to emphasize comfort with conservative treatment in NH  Cerebrovascular accident (CVA) due to other mechanism (H)  Essential hypertension  Hypertensive crisis treated at [facility] with supportive care  Residual right side weakness, confusion and aphasia.  Participates with therapy on prn basis  Remains on Toprolol XL, asa  BP Readings from Last 3 Encounters:   07/12/17 168/75   06/08/17 146/84   05/31/17 98/56      Neuropathy  Leg and foot pain  improved with addition of neurontin  No adverse s/e reported   Pain  R/t DJD On tramadol with improved pain management and no adverse s/e       ALLERGIES: Review of patient's allergies indicates no known allergies.  PAST MEDICAL HISTORY:  has a past medical history of Annual physical exam  8/12/13 (8/12/2013); Anorexia; At risk for falling (7/8/2013); Coronary artery disease; CVA (cerebral infarction); Difficulty in walking(719.7) (7/8/2013); Generalized weakness (7/8/2013); Hypertension; Intracranial bleed (H); Lack of appetite (7/8/2013); Neuropathy (H); Other motor neuron diseases (1992); Periph vascular dis NEC; Tobacco use disorder; and Tobacco use disorder. He also has no past medical history of Basal cell carcinoma; Malignant melanoma (H); or Squamous cell  carcinoma.  PAST SURGICAL HISTORY:  has a past surgical history that includes surgical history of - and surgical history of - (6/21/2000).  FAMILY HISTORY: family history includes Alcohol/Drug in his father; DIABETES in his maternal grandmother.  SOCIAL HISTORY:  reports that he has quit smoking. His smoking use included Cigarettes. He quit after 55.00 years of use. He has never used smokeless tobacco. He reports that he does not drink alcohol or use illicit drugs.    MEDICATIONS:  Current Outpatient Prescriptions   Medication Sig Dispense Refill     amoxicillin (AMOXIL) 500 MG tablet Take 2,000 mg by mouth as needed Prior to dental procedure       fluticasone (FLONASE) 50 MCG/ACT nasal spray Spray 1 spray into both nostrils daily       triamcinolone (KENALOG) 0.1 % cream Apply topically 2 times daily as needed for irritation       menthol-zinc oxide (CALMOSEPTINE) 0.44-20.625 % OINT Apply topically as needed        SODIUM PHOSPHATES RE Place 1 enema rectally every 72 hours as needed If no BM and no results from suppository by 1200       ammonium lactate (LAC-HYDRIN) 12 % lotion Apply topically At Bedtime       hypromellose (NATURAL BALANCE TEARS) 0.4 % SOLN Place 1 drop into both eyes every hour as needed for dry eyes       bisacodyl (DULCOLAX) 10 MG suppository Place 10 mg rectally every 3 days If no BM       magnesium citrate solution Take 240 mLs by mouth See Admin Instructions       magnesium hydroxide (MILK OF MAGNESIA) 400 MG/5ML suspension Take 30 mLs by mouth daily as needed for constipation or heartburn       loratadine (CLARITIN) 10 MG tablet Take 10 mg by mouth daily as needed        TraMADol HCl (ULTRAM PO) Take 25 mg by mouth every 8 hours as needed for moderate to severe pain And 25 mg po q hs.  And 25 mg po q 3 hours prn       Gabapentin (NEURONTIN PO) Take 200 mg by mouth 2 times daily        nystatin (MYCOSTATIN) cream Apply topically 2 times daily as needed        aspirin 81 MG tablet Take 1  "tablet (81 mg) by mouth daily 30 tablet      sennosides (SENOKOT) 8.6 MG tablet Take 1 tablet by mouth 2 times daily Every other day and 1 tablet BID prn       hydrocortisone (TH HYDROCORTISONE) 1 % cream Apply topically 2 times daily        Acetaminophen (TYLENOL PO) Take 325 mg by mouth every 4 hours as needed for mild pain or fever And 325 mg every morning       olopatadine (PATANOL) 0.1 % ophthalmic solution Place 1 drop into both eyes 2 times daily as needed.       Medications reviewed:  Medications reconciled to facility chart and changes were made to reflect current medications as identified as above med list. Below are the changes that were made:   Medications stopped since last EPIC medication reconciliation:   There are no discontinued medications.    Medications started since last [facility] medication reconciliation:  No orders of the defined types were placed in this encounter.         Case Management:  I have reviewed the care plan and MDS and do agree with the plan. Patient's desire to return to the community is present, but is not able due to care needs .  Information reviewed:  Medications, vital signs, orders, and nursing notes.    ROS:  4 point ROS including Respiratory, CV, GI and , other than that noted in the HPI,  is negative    Exam:  Vitals: /75  Pulse 76  Temp 98.6  F (37  C)  Resp 18  Ht 5' 7\" (1.702 m)  Wt 115 lb (52.2 kg)  SpO2 95%  BMI 18.01 kg/m2  BMI= Body mass index is 18.01 kg/(m^2).  GENERAL APPEARANCE:  Alert, in no distress, up in w/c smiling and friendly    RESP:  respiratory effort of chest normal, auscultation of lungs clear , no respiratory distress  CV:   auscultation of heart done , rate and rhythm reg, no murmur, no peripheral edema  ABDOMEN:  normal bowel sounds, soft, nontender,  M/S:   Gait and station w/c, thin frail kyphotic right side weakness, wrist and hand contractures    SKIN:   pale w/d   PSYCH:  insight and judgement, memory impaired , affect and mood " normal       Lab/Diagnostic data:  CBC RESULTS:   Recent Labs   Lab Test  04/24/17   0700  01/04/17   0641   12/10/14   0411  12/09/14   1604   WBC   --    --    --   8.6  6.9   RBC   --    --    --   4.18*  4.59   HGB  10.8*  11.9*   < >  13.3  14.5   HCT   --    --    --   38.1*  42.1   MCV   --    --    --   91  92   MCH   --    --    --   31.8  31.6   MCHC   --    --    --   34.9  34.4   RDW   --    --    --   12.9  12.3   PLT   --    --    --   214  267    < > = values in this interval not displayed.       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    Total time spent with patient visit was 25 min including patient visit and review of past records. Greater than 50% of total time spent with counseling and coordinating care .       Electronically signed by:  TARUN Lino CNP

## 2017-08-07 NOTE — PROGRESS NOTES
This patient's medication list and chart were reviewed as part of the service provided by [facility].    Assessment/Recommendations:    No recommendations for changes at this time.    Please note that most recent NP note indicates pt is on Metoprolol ER.  [employer] med list does not show this.  Please reconcile.      Connie Caban, Pharm.D.,[facility]  Board Certified Geriatric Pharmacist  Medication Therapy Management Pharmacist  [phone]

## 2017-08-09 NOTE — PATIENT INSTRUCTIONS
Orders:  1.  X-ray left shoulder/humerus.  2.  Increase Tramadol to 25 mg po tid and po q 3 hours prn.  3.  Muscle rub to neck/shoulder bid.

## 2017-08-09 NOTE — PROGRESS NOTES
[place] [facility]    Chief Complaint   Patient presents with     RECHECK       HPI:    Jj Castro is a 87 year old  (1/16/1930), who is being seen today for an episodic care visit at [facility] .  HPI information obtained from: facility chart records, facility staff, patient report and [facility] chart review.Today's concern is: Here to see patient at request of patient   Anterior horn cell disease (H)  With physical and functional decline  Unable to manage safely at home.  Relies on nursing for total care r/t weakness, hand and wrist contractures.  Goal is to emphasize comfort with conservative treatment in NH  Cerebral infarction due to other mechanism (H)  Patient with hx of hypertensive CVA with hemiplegia, difficulty walking and increased weakness  On asa, Toprol XL.   Denies CP/SOB, HA  Neuropathy (H)  Significant pain in RUE/LE managed up to now with neurontin and tramadol.  Now patient states he has unmanaged pain, tenderness and thinks it is his neuropathy.that is worse in addition to his recent fall with possible injury       Difficulty walking  Pain  W/c bound.  Hx of recurring falls some with injury or fracture  Patient has been falling from his chair either because of behaviors or because of falling asleep in the chair and then falling.  Falls are unwitnessed but last fall staff found him on his right side and that is when he started c/o RUE and neck pain.  No bruising or OA visualized.  Patient thinks it feels like a shooting pain         ALLERGIES: Review of patient's allergies indicates no known allergies.  Past Medical, Surgical, Family and Social History reviewed and updated in [facility].    Current Outpatient Prescriptions   Medication Sig Dispense Refill     metoprolol (TOPROL-XL) 50 MG 24 hr tablet Take 1 tablet (50 mg) by mouth daily 90 tablet 3     amoxicillin (AMOXIL) 500 MG tablet Take 2,000 mg by mouth as needed Prior to dental procedure       fluticasone  (FLONASE) 50 MCG/ACT nasal spray Spray 1 spray into both nostrils daily       triamcinolone (KENALOG) 0.1 % cream Apply topically 2 times daily as needed for irritation       menthol-zinc oxide (CALMOSEPTINE) 0.44-20.625 % OINT Apply topically as needed        SODIUM PHOSPHATES RE Place 1 enema rectally every 72 hours as needed If no BM and no results from suppository by 1200       ammonium lactate (LAC-HYDRIN) 12 % lotion Apply topically At Bedtime       hypromellose (NATURAL BALANCE TEARS) 0.4 % SOLN Place 1 drop into both eyes every hour as needed for dry eyes       bisacodyl (DULCOLAX) 10 MG suppository Place 10 mg rectally every 3 days If no BM       magnesium citrate solution Take 240 mLs by mouth See Admin Instructions       magnesium hydroxide (MILK OF MAGNESIA) 400 MG/5ML suspension Take 30 mLs by mouth daily as needed for constipation or heartburn       loratadine (CLARITIN) 10 MG tablet Take 10 mg by mouth daily as needed        TraMADol HCl (ULTRAM PO) Take 25 mg by mouth 3 times daily And 25 mg po q 3 hours prn       Gabapentin (NEURONTIN PO) Take 200 mg by mouth 2 times daily        nystatin (MYCOSTATIN) cream Apply topically 2 times daily as needed        aspirin 81 MG tablet Take 1 tablet (81 mg) by mouth daily 30 tablet      sennosides (SENOKOT) 8.6 MG tablet Take 1 tablet by mouth 2 times daily Every other day and 1 tablet BID prn       hydrocortisone (TH HYDROCORTISONE) 1 % cream Apply topically 2 times daily        Acetaminophen (TYLENOL PO) Take 325 mg by mouth every 4 hours as needed for mild pain or fever And 325 mg every morning       olopatadine (PATANOL) 0.1 % ophthalmic solution Place 1 drop into both eyes 2 times daily as needed.       Medications reviewed:  Medications reconciled to facility chart and changes were made to reflect current medications as identified as above med list. Below are the changes that were made:   Medications stopped since last EPIC medication reconciliation:    There are no discontinued medications.    Medications started since last [facility] medication reconciliation:  No orders of the defined types were placed in this encounter.       REVIEW OF SYSTEMS:  Unobtainable secondary to cognitive impairment or aphasia. and 4 point ROS including Respiratory, CV, GI and , other than that noted in the HPI,  is negative    Physical Exam:  /65  Pulse 70  Temp 97  F (36.1  C)  Resp 18  Wt 114 lb (51.7 kg)  SpO2 95%  BMI 17.85 kg/m2  GENERAL APPEARANCE:  Alert, in no distress, up in w/c smiling and friendly    RESP:  respiratory effort of chest normal, auscultation of lungs clear , no respiratory distress  CV:   auscultation of heart done , rate and rhythm reg, no murmur, no peripheral edema  ABDOMEN:  normal bowel sounds, soft, nontender,  M/S:   Gait and station w/c, thin frail kyphotic right side weakness, wrist and hand contractures  Tenderness to palpation to neck, right shoulder and brachial muscle  No bruising  SKIN:   pale w/d   PSYCH:  insight and judgement, memory impaired , affect and mood normal       Recent Labs:  CBC RESULTS:   Recent Labs   Lab Test  04/24/17   0700  01/04/17   0641   12/10/14   0411  12/09/14   1604   WBC   --    --    --   8.6  6.9   RBC   --    --    --   4.18*  4.59   HGB  10.8*  11.9*   < >  13.3  14.5   HCT   --    --    --   38.1*  42.1   MCV   --    --    --   91  92   MCH   --    --    --   31.8  31.6   MCHC   --    --    --   34.9  34.4   RDW   --    --    --   12.9  12.3   PLT   --    --    --   214  267    < > = values in this interval not displayed.       Last Basic Metabolic Panel:  Recent Labs   Lab Test  04/24/17   0700  01/04/17   0641   NA  136  134   POTASSIUM  4.3  4.5   CHLORIDE  103  101   PAULINE  8.5  8.9   CO2  24  27   BUN  11  10   CR  0.51*  0.51*   GLC  91  91       Liver Function Studies -   Recent Labs   Lab Test  05/15/13   1500   PROTTOTAL  7.6   ALBUMIN  4.2   BILITOTAL  0.6   ALKPHOS  111   AST  25   ALT  23        No results found for: TSH]    Lab Results   Component Value Date    A1C 5.5 12/10/2014     Assessment/Plan:     Anterior horn cell disease (H)  Cerebral infarction due to other mechanism (H)  Neuropathy (H)  Difficulty walking  Pain      Orders:  1.  X-ray left shoulder/humerus.  2.  Increase Tramadol to 25 mg po tid and po q 3 hours prn.  3.  Muscle rub to neck/shoulder bid.  4 occupational therapy eval for pain modalities including heat, massage, and chair positioning      Total time spent with patient visit at the skilled nursing facility was 25 min including patient visit and review of past records. Greater than 50% of total time spent with counseling and coordinating care     Electronically signed by  TARUN Lino CNP

## 2017-08-15 NOTE — PROGRESS NOTES
"[place] [facility]    Chief Complaint   Patient presents with     RECHECK       HPI:    Jj Castro is a 87 year old  (1/16/1930), who is being seen today for an episodic care visit at [facility] .  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  Xray of arm and shoulder neg for fx or dislocation.  Patient feels pain with palpation and ROM.  No bruising.  Patient describes the pain as starting at shoulder and radiating to past elbow.    ALLERGIES: Review of patient's allergies indicates no known allergies.  Past Medical, Surgical, Family and Social History reviewed and [facility] in [facility].    Current Outpatient Prescriptions   Medication Sig Dispense Refill     metoprolol (TOPROL-XL) 50 MG 24 hr tablet Take 1 tablet (50 mg) by mouth daily 90 tablet 3     amoxicillin (AMOXIL) 500 MG tablet Take 2,000 mg by mouth as needed Prior to dental procedure       fluticasone (FLONASE) 50 MCG/ACT nasal spray Spray 1 spray into both nostrils daily       triamcinolone (KENALOG) 0.1 % cream Apply topically 2 times daily as needed for irritation       menthol-zinc oxide (CALMOSEPTINE) 0.44-20.625 % OINT Apply topically as needed        SODIUM PHOSPHATES RE Place 1 enema rectally every 72 hours as needed If no BM and no results from suppository by 1200       ammonium lactate (LAC-HYDRIN) 12 % lotion Apply topically At Bedtime       hypromellose (NATURAL BALANCE TEARS) 0.4 % SOLN " "Place 1 drop into both eyes every hour as needed for dry eyes       bisacodyl (DULCOLAX) 10 MG suppository Place 10 mg rectally every 3 days If no BM       magnesium citrate solution Take 240 mLs by mouth See Admin Instructions       magnesium hydroxide (MILK OF MAGNESIA) 400 MG/5ML suspension Take 30 mLs by mouth daily as needed for constipation or heartburn       loratadine (CLARITIN) 10 MG tablet Take 10 mg by mouth daily as needed        TraMADol HCl (ULTRAM PO) Take 25 mg by mouth 3 times daily And 25 mg po q 3 hours prn       Gabapentin (NEURONTIN PO) Take 200 mg by mouth 2 times daily        nystatin (MYCOSTATIN) cream Apply topically 2 times daily as needed        aspirin 81 MG tablet Take 1 tablet (81 mg) by mouth daily 30 tablet      sennosides (SENOKOT) 8.6 MG tablet Take 1 tablet by mouth 2 times daily Every other day and 1 tablet BID prn       hydrocortisone (TH HYDROCORTISONE) 1 % cream Apply topically 2 times daily        Acetaminophen (TYLENOL PO) Take 325 mg by mouth every 4 hours as needed for mild pain or fever And 325 mg every morning       olopatadine (PATANOL) 0.1 % ophthalmic solution Place 1 drop into both eyes 2 times daily as needed.       Medications reviewed:  Medications reconciled to facility chart and changes were made to reflect current medications as identified as above med list. Below are the changes that were made:   Medications stopped since last EPIC medication reconciliation:   There are no discontinued medications.    Medications started since last [facility] medication reconciliation:  No orders of the defined types were placed in this encounter.       REVIEW OF SYSTEMS:  4 point ROS including Respiratory, CV, GI and , other than that noted in the HPI,  is negative    Physical Exam:  /62  Pulse 66  Temp 97.2  F (36.2  C)  Resp 17  Ht 5' 7\" (1.702 m)  Wt 114 lb (51.7 kg)  SpO2 95%  BMI 17.85 kg/m2  GENERAL APPEARANCE:  Alert, in no distress, up in w/c smiling and " friendly    RESP:  respiratory effort of chest normal, auscultation of lungs clear , no respiratory distress  CV:   auscultation of heart done , rate and rhythm reg, no murmur, no peripheral edema  ABDOMEN:  normal bowel sounds, soft, nontender,  M/S:   Gait and station w/c, thin frail kyphotic right side weakness, wrist and hand contractures  Tenderness to palpation to neck, right shoulder and brachial muscle  No bruising  SKIN:   pale w/d   PSYCH:  insight and judgement, memory impaired , affect and mood normal       Recent Labs:  CBC RESULTS:   Recent Labs   Lab Test  04/24/17   0700  01/04/17   0641   12/10/14   0411  12/09/14   1604   WBC   --    --    --   8.6  6.9   RBC   --    --    --   4.18*  4.59   HGB  10.8*  11.9*   < >  13.3  14.5   HCT   --    --    --   38.1*  42.1   MCV   --    --    --   91  92   MCH   --    --    --   31.8  31.6   MCHC   --    --    --   34.9  34.4   RDW   --    --    --   12.9  12.3   PLT   --    --    --   214  267    < > = values in this interval not displayed.       Last Basic Metabolic Panel:  Recent Labs   Lab Test  04/24/17   0700  01/04/17   0641   NA  136  134   POTASSIUM  4.3  4.5   CHLORIDE  103  101   PAULINE  8.5  8.9   CO2  24  27   BUN  11  10   CR  0.51*  0.51*   GLC  91  91       Liver Function Studies -   Recent Labs   Lab Test  05/15/13   1500   PROTTOTAL  7.6   ALBUMIN  4.2   BILITOTAL  0.6   ALKPHOS  111   AST  25   ALT  23       No results found for: TSH]    Lab Results   Component Value Date    A1C 5.5 12/10/2014           Assessment/Plan:     Cerebrovascular accident (CVA) due to other mechanism (H)  Neuropathy (H)  Difficulty walking  Right arm pain      Orders:  1.  Update Dr. Feliz for possible right shoulder cortisone injection.  2 arthritis cream tid to neck and shoulder,  Continue tramadole      Total time spent with patient visit at the [facility] nursing [facility] was 25 min including patient visit and review of past records. Greater than 50% of total time  spent with counseling and coordinating care     Electronically signed by  TARUN Lino CNP

## 2017-08-30 NOTE — PROGRESS NOTES
[place] [facility]    Chief Complaint   Patient presents with     RECHECK       HPI:    Jj Castro is a 87 year old  (1/16/1930), who is being seen today for an episodic care visit at [facility] .  HPI information obtained from: facility chart records, facility staff, patient report and [facility] chart review.Today's concern is:  Cerebrovascular accident (CVA) due to other mechanism (H)  Anterior horn cell disease (H)  Neuropathy (H)  Right shoulder pain  Patient with long standing UE/LE weakness, recurring falls and hx of neuropathic pain s/p CVA.  Now with persistent pain requiring increased tramadol and prn dosing of tramadol  Patient frustrated r/t ongoing pain and feels like there is no improvement  Discussed with Dr Feliz who is willing to evaluate patient right shoulder for possible cortisone injection.  Discussed with patient and staff  Goal is to emphasize comfort with conservative treatment in NH   Patient would have a very difficult time with CT/MRI r/t spastic movements and impulsive behaviors  Xray of right shoulder neg for fracture or dislocation  ALLERGIES: Review of patient's allergies indicates no known allergies.  Past Medical, Surgical, Family and Social History reviewed and updated in [facility].    Current Outpatient Prescriptions   Medication Sig Dispense Refill     metoprolol (TOPROL-XL) 50 MG 24 hr tablet Take 1 tablet (50 mg) by mouth daily 90 tablet 3     amoxicillin (AMOXIL) 500 MG tablet Take 2,000 mg by mouth as needed Prior to dental procedure       fluticasone (FLONASE) 50 MCG/ACT nasal spray Spray 1 spray into both nostrils daily       triamcinolone (KENALOG) 0.1 % cream Apply topically 2 times daily as needed for irritation       menthol-zinc oxide (CALMOSEPTINE) 0.44-20.625 % OINT Apply topically as needed        SODIUM PHOSPHATES RE Place 1 enema rectally every 72 hours as needed If no BM and no results from suppository by 1200       ammonium lactate  (LAC-HYDRIN) 12 % lotion Apply topically At Bedtime       hypromellose (NATURAL BALANCE TEARS) 0.4 % SOLN Place 1 drop into both eyes every hour as needed for dry eyes       bisacodyl (DULCOLAX) 10 MG suppository Place 10 mg rectally every 3 days If no BM       magnesium citrate solution Take 240 mLs by mouth See Admin Instructions       magnesium hydroxide (MILK OF MAGNESIA) 400 MG/5ML suspension Take 30 mLs by mouth daily as needed for constipation or heartburn       loratadine (CLARITIN) 10 MG tablet Take 10 mg by mouth daily as needed        TraMADol HCl (ULTRAM PO) Take 25 mg by mouth 3 times daily And 25 mg po q 3 hours prn       Gabapentin (NEURONTIN PO) Take 200 mg by mouth 2 times daily        nystatin (MYCOSTATIN) cream Apply topically 2 times daily as needed        aspirin 81 MG tablet Take 1 tablet (81 mg) by mouth daily 30 tablet      sennosides (SENOKOT) 8.6 MG tablet Take 1 tablet by mouth 2 times daily Every other day and 1 tablet BID prn       hydrocortisone (TH HYDROCORTISONE) 1 % cream Apply topically 2 times daily        Acetaminophen (TYLENOL PO) Take 325 mg by mouth every 4 hours as needed for mild pain or fever And 325 mg every morning       olopatadine (PATANOL) 0.1 % ophthalmic solution Place 1 drop into both eyes 2 times daily as needed.       Medications reviewed:  Medications reconciled to facility chart and changes were made to reflect current medications as identified as above med list. Below are the changes that were made:   Medications stopped since last EPIC medication reconciliation:   There are no discontinued medications.    Medications started since last [facility] medication reconciliation:  No orders of the defined types were placed in this encounter.       REVIEW OF SYSTEMS:  Unobtainable secondary to cognitive impairment or aphasia. and 4 point ROS including Respiratory, CV, GI and , other than that noted in the HPI,  is negative    Physical Exam:  /68  Pulse 61  Temp  "97.3  F (36.3  C)  Resp 18  Ht 5' 7\" (1.702 m)  Wt 115 lb (52.2 kg)  SpO2 95%  BMI 18.01 kg/m2  GENERAL APPEARANCE:  Alert, in no distress, up in w/c smiling and friendly    RESP:  respiratory effort of chest normal, auscultation of lungs clear , no respiratory distress  CV:   auscultation of heart done , rate and rhythm reg, no murmur, no peripheral edema  ABDOMEN:  normal bowel sounds, soft, nontender,  M/S:   Gait and station w/c, thin frail kyphotic right side weakness, wrist and hand contractures  Tenderness to palpation to neck, right shoulder and brachial muscle  No bruising  SKIN:   pale w/d   PSYCH:  insight and judgement, memory impaired , affect and mood normal       Recent Labs:  CBC RESULTS:   Recent Labs   Lab Test  04/24/17   0700  01/04/17   0641   12/10/14   0411  12/09/14   1604   WBC   --    --    --   8.6  6.9   RBC   --    --    --   4.18*  4.59   HGB  10.8*  11.9*   < >  13.3  14.5   HCT   --    --    --   38.1*  42.1   MCV   --    --    --   91  92   MCH   --    --    --   31.8  31.6   MCHC   --    --    --   34.9  34.4   RDW   --    --    --   12.9  12.3   PLT   --    --    --   214  267    < > = values in this interval not displayed.       Last Basic Metabolic Panel:  Recent Labs   Lab Test  04/24/17   0700  01/04/17   0641   NA  136  134   POTASSIUM  4.3  4.5   CHLORIDE  103  101   PAULINE  8.5  8.9   CO2  24  27   BUN  11  10   CR  0.51*  0.51*   GLC  91  91       Liver Function Studies -   Recent Labs   Lab Test  05/15/13   1500   PROTTOTAL  7.6   ALBUMIN  4.2   BILITOTAL  0.6   ALKPHOS  111   AST  25   ALT  23       No results found for: TSH]    Lab Results   Component Value Date    A1C 5.5 12/10/2014         Assessment/Plan:     Cerebrovascular accident (CVA) due to other mechanism (H)  Anterior horn cell disease (H)  Neuropathy (H)  Right shoulder pain      Orders:  Dr Feliz to evaluate shoulder for possible injection  Continue same neurontin and tramadol    Total time spent with " patient visit at the skilled nursing facility was 25 min including patient visit and review of past records. Greater than 50% of total time spent with counseling and coordinating care     Electronically signed by  Daya Wang CMA

## 2017-09-07 NOTE — PROGRESS NOTES
[place] [facility]    Chief Complaint   Patient presents with     [employer] Regulatory       HPI:    Jj Castro is a 87 year old  (1/16/1930), who is being seen today for a federally mandated E/M visit at [facility] .  HPI information obtained from: facility chart records, facility staff and patient report.     - continues to c/o right shoulder pain, tramadol increased with minimal improvement, xray no fx, cannot go for MRI or CT due to impulsive behavior and spastic movements.   - reports pain is sharp in forearm and arm when raises his left arm, 8/10 when severe, pain pills help but not a whole lot. Pain is worse in the morning, reports sleeps most of the time on the left side due to lack of energy to turn to the right side. Pain is mostly when raises left shoulder. Believes did cause a nerve damage since he puts his arm around the back support.   ----------------------------------------------------------  # Past Medical, social, family histories, medications, and allergies reviewed and updated  # Medications reviewed: in the chart and EHR.   # Case Management:  I have reviewed the care plan and MDS and do agree with the plan. Patient's desire to return to the community is not present.  Information reviewed:  Medications, vital signs, orders, and nursing notes.    ROS:  10 point ROS of systems including Constitutional, Eyes, Respiratory, Cardiovascular, Gastroenterology, Genitourinary, Integumentary, Muscularskeletal, Psychiatric were all negative except for pertinent positives noted in my HPI.    Exam:  Vitals: BP 96/59  Pulse 79  Temp 96.3  F (35.7  C)  Resp 18  Wt 111 lb (50.3 kg)  SpO2 95%  BMI 17.39 kg/m2  BMI= Body mass index is 17.39 kg/(m^2).  GENERAL APPEARANCE: Alert, in no distress  ENT: Mouth and posterior oropharynx normal, moist mucous membranes  EYES: EOM, conjunctivae, lids, pupils and irises normal  RESP: respiratory effort and palpation of chest normal,  lungs clear to auscultation , no respiratory distress  CV: Palpation and auscultation of heart done , regular rate and rhythm, no murmur, rub, or gallop, no edema  ABDOMEN: normal bowel sounds, soft, nontender, no hepatosplenomegaly or other masses  M/S: Gait and station abnormal Uses a walker for ambulation. No calf swelling or tenderness.   SKIN: Inspection of skin and subcutaneous tissue baseline, Palpation of skin and subcutaneous tissue baseline  NEURO: Cranial nerves 2-12 are normal tested and grossly at patient's baseline,   - Ms strength: absent b/l dorsiflexion. RLE: hip flexors 3/5, knee extension 4/5. 3/5 RUE but 0/5 right wrist (both flexion and extension),    - 5/5 Left thigh hip flexors and 4/5 left knee extensor  - LUE: 0/5 left wrist flexion, 3/5 extension. Arm flexion and extension 4/5.   - Muscles atrophy generalized.   PSYCH: oriented X 3, normal insight, judgement and memory    Lab/Diagnostic data:  All labs reviewed, none recent.      ASSESSMENT/PLAN    Pain left arm:  - 2/2 left median and ulnar nerve palsy, and overuse the extensors muscles of LUE, causing spasm and fatigue.   - consult OT for improving ROM  - may benefit from message therapy  - counseled about the mechanism of pain, and provided with some exercise to do in am and tid to reduce muscle spasm.   - continue tramadol,  - reduce Neurontin from 200 mg bid to 100 mg bid      Cerebrovascular accident (CVA) due to other mechanism (H)  Hemiplegia (H)  - right sided weakness, also weakness in other limbs.   - WC bound. Self propel      Protein-calorie malnutrition (H)  - Body mass index is 17.39 kg/(m^2).  - Partly could be due to Sarcopenia  - on prn nutrition supplement.   - Keep BMI b/w 25-35 in frail elderly.       Neuropathy (H)  - with absent dorsiflexion b/l. On care and comfort plan.   - On Neurontin 200 mg bid, will reduce it to 100 mg bid and continue to monitor.     Orders:  1. OT for LUE to improve ROM  2. Reduce Neurontin  from 200 mg BID to 100 mg po BID  3. DC Flonase nasal spray   4. DC tylenol 325 mg po qd  5. Start tylenol 650 mg BID scheduled  6. DC tylenol 325 po q 4th prn  7. Start tylenol 650 mg po TID prn pain   8. Ultram 25 mg po TID prn pain Add prn pain 5-10/10      Electronically signed by:  Yuliana Feliz MD

## 2017-10-03 NOTE — PROGRESS NOTES
[place] [facility]  Chief Complaint   Patient presents with     Annual Comprehensive Nursing Home       HPI:    Jj Castro is a 87 year old  (1/16/1930), who is being seen today for an annual comprehensive visit at [facility] .  HPI information obtained from: facility chart records, facility staff, patient report and [facility] chart review.  Today's concerns are:  Anterior horn cell disease (H)  Limited movement of UE/LE, has been w/c bound for a number of years.  Previously followed by neurology now wants emphasis on comfort  Cerebrovascular accident (CVA) due to other mechanism (H)  Significant deficits requiring NH placement  With physical and functional decline  Goal is to emphasize comfort with conservative treatment in NH  Neuropathy  With limited LE sensation and ambulation  On neurontin with improved comfort and function  Malnutrition, unspecified type (H)  Body mass index is 17.23 kg/(m^2).  Fair to good po intake  Followed by dietician and po intake supervised by nursing    Essential hypertension  Variable  Denies CP/SOB, HA  BP Readings from Last 3 Encounters:   10/03/17 132/88   09/07/17 96/59   08/30/17 132/68   Other depression  No obvious s/s of depression   Needs 1:1 attention and TLC frequently r/t STML but o/w appears comfortable  Denies sadness and depression  ALLERGIES: Review of patient's allergies indicates no known allergies.  PROBLEM LIST:  Patient Active Problem List   Diagnosis     BPH (benign prostatic hyperplasia)     Debility     Multifocal motor neuropathy (H)     Difficulty walking     Generalized weakness     Essential hypertension     Anterior horn cell disease (H)      ANNUAL 10/24/16     Memory loss     Stroke (H)     Neuropathy (H)     Bilateral hearing loss     Advance Care Planning     Protein-calorie malnutrition (H)     PAST MEDICAL HISTORY:  has a past medical history of Annual physical exam  8/12/13 (8/12/2013); Anorexia; At risk for  falling (7/8/2013); Coronary artery disease; CVA (cerebral infarction); Difficulty in walking(719.7) (7/8/2013); Generalized weakness (7/8/2013); Hypertension; Intracranial bleed (H); Lack of appetite (7/8/2013); Neuropathy (H); Other motor neuron diseases (1992); Other peripheral vascular disease(443.89); Tobacco use disorder; and Tobacco use disorder. He also has no past medical history of Basal cell carcinoma; Malignant melanoma (H); or Squamous cell carcinoma.  PAST SURGICAL HISTORY:  has a past surgical history that includes surgical history of - and surgical history of - (6/21/2000).  FAMILY HISTORY: family history includes Alcohol/Drug in his father; DIABETES in his maternal grandmother.  SOCIAL HISTORY:  reports that he has quit smoking. His smoking use included Cigarettes. He quit after 55.00 years of use. He has never used smokeless tobacco. He reports that he does not drink alcohol or use illicit drugs.  IMMUNIZATIONS:  Most Recent Immunizations   Administered Date(s) Administered     Pneumococcal 23 valent 10/02/2003   Deferred Date(s) Deferred     Influenza (High Dose) 3 valent vaccine 11/03/2016     Influenza (IIV3) 11/03/2016     Pneumococcal (PCV 13) 11/03/2016     Pneumococcal 23 valent 11/03/2016     TDAP Vaccine (Adacel) 11/03/2016     Above immunizations pulled from [facility]. [facility] and facility records also reconciled. Outstanding information sent to  to update [facility]  .  Future immunizations needed:  yearly influenza per facility protocol  MEDICATIONS:  Current Outpatient Prescriptions   Medication Sig Dispense Refill     TRAZODONE HCL PO Take 25 mg by mouth At Bedtime And 25 mg po after 2am       Acetaminophen (TYLENOL PO) Take 650 mg by mouth 3 times daily       metoprolol (TOPROL-XL) 50 MG 24 hr tablet Take 1 tablet (50 mg) by mouth daily 90 tablet 3     amoxicillin (AMOXIL) 500 MG tablet Take 2,000 mg by mouth as needed Prior to dental procedure       fluticasone  (FLONASE) 50 MCG/ACT nasal spray Spray 1 spray into both nostrils daily       triamcinolone (KENALOG) 0.1 % cream Apply topically 2 times daily as needed for irritation       menthol-zinc oxide (CALMOSEPTINE) 0.44-20.625 % OINT Apply topically as needed        SODIUM PHOSPHATES RE Place 1 enema rectally every 72 hours as needed If no BM and no results from suppository by 1200       ammonium lactate (LAC-HYDRIN) 12 % lotion Apply topically At Bedtime       hypromellose (NATURAL BALANCE TEARS) 0.4 % SOLN Place 1 drop into both eyes every hour as needed for dry eyes       bisacodyl (DULCOLAX) 10 MG suppository Place 10 mg rectally every 3 days If no BM       magnesium citrate solution Take 240 mLs by mouth See Admin Instructions       magnesium hydroxide (MILK OF MAGNESIA) 400 MG/5ML suspension Take 30 mLs by mouth daily as needed for constipation or heartburn       loratadine (CLARITIN) 10 MG tablet Take 10 mg by mouth daily as needed        TraMADol HCl (ULTRAM PO) Take 25 mg by mouth 3 times daily And 25 mg po q 3 hours prn       Gabapentin (NEURONTIN PO) Take 100 mg by mouth 2 times daily        nystatin (MYCOSTATIN) cream Apply topically 2 times daily as needed        aspirin 81 MG tablet Take 1 tablet (81 mg) by mouth daily 30 tablet      sennosides (SENOKOT) 8.6 MG tablet Take 1 tablet by mouth 2 times daily Every other day and 1 tablet BID prn       hydrocortisone (TH HYDROCORTISONE) 1 % cream Apply topically 2 times daily        Acetaminophen (TYLENOL PO) Take 650 mg by mouth 2 times daily And 325 mg every morning       olopatadine (PATANOL) 0.1 % ophthalmic solution Place 1 drop into both eyes 2 times daily as needed.       Medications reviewed:  Medications reconciled to facility chart and changes were made to reflect current medications as identified as above med list. Below are the changes that were made:   Medications stopped since last EPIC medication reconciliation:   There are no discontinued  "medications.    Medications started since last [facility] medication reconciliation:  No orders of the defined types were placed in this encounter.    Case Management:  I have reviewed the facility/SNF care plan/MDS which was done 10/3, including the falls risk, nutrition and pain screening. I also reviewed the current immunizations, and preventive care..Future cancer screening is not clinically indicated secondary to age/goals of care Patient's desire to return to the community is present, but is not able due to care needs . Current Level of Care is appropriate.    Advance Directive Discussion:    I reviewed the current advanced directives as reflected in EPIC, the POLST and the facility chart, and verified the congruency of orders  I ccontacted the first party Lindsey Castro and left a message regarding the plan of Care.  I did review the advance directives with the resident.     Team Discussion:  I communicated with the appropriate disciplines involved with the Plan of Care:   Nursing:  Lee Ann    Patient Goal:  Patient's goal is pain control and comfort.    Information reviewed:  Medications, vital signs, orders, and nursing notes.    ROS:  10 point ROS of systems including Constitutional, Eyes, Respiratory, Cardiovascular, Gastroenterology, Genitourinary, Integumentary, Muscularskeletal, Psychiatric were all negative except for pertinent positives noted in my HPI.    Exam:  /88  Pulse 64  Temp 97.8  F (36.6  C)  Resp 20  Ht 5' 7\" (1.702 m)  Wt 110 lb (49.9 kg)  SpO2 95%  BMI 17.23 kg/m2    GENERAL APPEARANCE:  Alert, in no distress, thin  ENT:  [place], oral mucous membranes moist  EYES:  EOM normal, conjunctiva and lids normal  NECK:  No adenopathy,masses or thyromegaly  RESP:  lungs clear to auscultation , no respiratory distress, diminished breath sounds bases  CV:  regular rate and rhythm, no murmur   ABDOMEN:  bowel sounds normal, soft, non-tender  M/S:   Gait and station abnormal w/c bound  thin frail " kyphotic wrist contractures  SKIN:  pale w/d   PSYCH:  oriented to self, insight and judgement impaired, memory impaired , affect and mood normal     Lab/Diagnostic data:  CBC RESULTS:   Recent Labs   Lab Test  04/24/17   0700  01/04/17   0641   12/10/14   0411  12/09/14   1604   WBC   --    --    --   8.6  6.9   RBC   --    --    --   4.18*  4.59   HGB  10.8*  11.9*   < >  13.3  14.5   HCT   --    --    --   38.1*  42.1   MCV   --    --    --   91  92   MCH   --    --    --   31.8  31.6   MCHC   --    --    --   34.9  34.4   RDW   --    --    --   12.9  12.3   PLT   --    --    --   214  267    < > = values in this interval not displayed.       Last Basic Metabolic Panel:  Recent Labs   Lab Test  04/24/17   0700  01/04/17   0641   NA  136  134   POTASSIUM  4.3  4.5   CHLORIDE  103  101   PAULINE  8.5  8.9   CO2  24  27   BUN  11  10   CR  0.51*  0.51*   GLC  91  91       Liver Function Studies -   Recent Labs   Lab Test  05/15/13   1500   PROTTOTAL  7.6   ALBUMIN  4.2   BILITOTAL  0.6   ALKPHOS  111   AST  25   ALT  23       No results found for: TSH]    Lab Results   Component Value Date    A1C 5.5 12/10/2014           ASSESSMENT/PLAN     Anterior horn cell disease (H)  Cerebrovascular accident (CVA) due to other mechanism (H)  Neuropathy  Malnutrition, unspecified type (H)  Essential hypertension  Other depression         Orders:  The current medical regimen is effective;  continue present plan and medications.    Electronically signed by:  Daya Wang CMA

## 2017-11-09 NOTE — PROGRESS NOTES
[facility]    Chief Complaint   Patient presents with     [facility]       HPI:    Jj Castro is a 87 year old  (1/16/1930), who is being seen today for a federally mandated E/M visit at [facility] .  HPI information obtained from: facility chart records, facility staff and patient report. Today's concerns are:     Anterior horn cell disease (H)  Cerebrovascular accident (CVA) due to other mechanism (H)  Neuropathy  Pain  Memory loss      ALLERGIES: Review of patient's allergies indicates no known allergies.  PAST MEDICAL HISTORY:  has a past medical history of Annual physical exam  8/12/13 (8/12/2013); Anorexia; At risk for falling (7/8/2013); Coronary artery disease; CVA (cerebral infarction); Difficulty in walking(719.7) (7/8/2013); Generalized weakness (7/8/2013); Hypertension; Intracranial bleed (H); Lack of appetite (7/8/2013); Neuropathy; Other motor neuron diseases (1992); Other peripheral vascular disease(443.89); Tobacco use disorder; and Tobacco use disorder. He also has no past medical history of Basal cell carcinoma; Malignant melanoma (H); or Squamous cell carcinoma.  PAST SURGICAL HISTORY:  has a past surgical history that includes surgical history of - and surgical history of - (6/21/2000).  FAMILY HISTORY: family history includes Alcohol/Drug in his father; DIABETES in his maternal grandmother.  SOCIAL HISTORY:  reports that he has quit smoking. His smoking use included Cigarettes. He quit after 55.00 years of use. He has never used smokeless tobacco. He reports that he does not drink alcohol or use illicit drugs.    MEDICATIONS:  Current Outpatient Prescriptions   Medication Sig Dispense Refill     TRAZODONE HCL PO Take 25 mg by mouth At Bedtime And 25 mg po after 2am       Acetaminophen (TYLENOL PO) Take 650 mg by mouth 3 times daily       metoprolol (TOPROL-XL) 50 MG 24 hr tablet Take 1 tablet (50 mg) by mouth daily 90 tablet 3     fluticasone  (FLONASE) 50 MCG/ACT nasal spray Spray 1 spray into both nostrils daily       triamcinolone (KENALOG) 0.1 % cream Apply topically 2 times daily as needed for irritation       menthol-zinc oxide (CALMOSEPTINE) 0.44-20.625 % OINT Apply topically as needed        SODIUM PHOSPHATES RE Place 1 enema rectally every 72 hours as needed If no BM and no results from suppository by 1200       ammonium lactate (LAC-HYDRIN) 12 % lotion Apply topically At Bedtime       hypromellose (NATURAL BALANCE TEARS) 0.4 % SOLN Place 1 drop into both eyes every hour as needed for dry eyes       bisacodyl (DULCOLAX) 10 MG suppository Place 10 mg rectally every 3 days If no BM       magnesium citrate solution Take 240 mLs by mouth See Admin Instructions       magnesium hydroxide (MILK OF MAGNESIA) 400 MG/5ML suspension Take 30 mLs by mouth daily as needed for constipation or heartburn       loratadine (CLARITIN) 10 MG tablet Take 10 mg by mouth daily as needed        TraMADol HCl (ULTRAM PO) Take 25 mg by mouth 3 times daily And 25 mg po q 3 hours prn       Gabapentin (NEURONTIN PO) Take 100 mg by mouth 2 times daily        nystatin (MYCOSTATIN) cream Apply topically 2 times daily as needed        aspirin 81 MG tablet Take 1 tablet (81 mg) by mouth daily 30 tablet      sennosides (SENOKOT) 8.6 MG tablet Take 1 tablet by mouth 2 times daily Every other day and 1 tablet BID prn       hydrocortisone (TH HYDROCORTISONE) 1 % cream Apply topically 2 times daily        Acetaminophen (TYLENOL PO) Take 650 mg by mouth 2 times daily And 325 mg every morning       olopatadine (PATANOL) 0.1 % ophthalmic solution Place 1 drop into both eyes 2 times daily as needed.       Medications reviewed:  Medications reconciled to facility chart and changes were made to reflect current medications as identified as above med list. Below are the changes that were made:   Medications stopped since last EPIC medication reconciliation:   There are no discontinued  "medications.    Medications started since last [facility] medication reconciliation:  No orders of the defined types were placed in this encounter.     Case Management:  I have reviewed the care plan and MDS and do agree with the plan. Patient's desire to return to the community is present, but is not able due to care needs .  Information reviewed:  Medications, vital signs, orders, and nursing notes.    ROS:  4 point ROS including Respiratory, CV, GI and , other than that noted in the HPI,  is negative    Exam:  Vitals: /73  Pulse 66  Temp 98.7  F (37.1  C)  Resp 17  Ht 5' 7\" (1.702 m)  Wt 108 lb (49 kg)  SpO2 95%  BMI 16.92 kg/m2  BMI= Body mass index is 16.92 kg/(m^2).    GENERAL APPEARANCE:  Alert, in no distress, thin  ENT:  [place], oral mucous membranes moist  EYES:  EOM normal, conjunctiva and lids normal  NECK:  No adenopathy,masses or thyromegaly  RESP:  lungs clear to auscultation , no respiratory distress, diminished breath sounds bases  CV:  regular rate and rhythm, no murmur   ABDOMEN:  bowel sounds normal, soft, non-tender  M/S:   Gait and station abnormal w/c bound  thin frail kyphotic wrist contractures  SKIN:  pale w/d   PSYCH:  oriented to self, insight and judgement impaired, memory impaired , affect and mood normal       Lab/Diagnostic data:  CBC RESULTS:   Recent Labs   Lab Test  10/11/17   0640  04/24/17   0700   12/10/14   0411  12/09/14   1604   WBC   --    --    --   8.6  6.9   RBC   --    --    --   4.18*  4.59   HGB  9.9*  10.8*   < >  13.3  14.5   HCT   --    --    --   38.1*  42.1   MCV   --    --    --   91  92   MCH   --    --    --   31.8  31.6   MCHC   --    --    --   34.9  34.4   RDW   --    --    --   12.9  12.3   PLT   --    --    --   214  267    < > = values in this interval not displayed.       Last Basic Metabolic Panel:  Recent Labs   Lab Test  10/11/17   0640  04/24/17   0700   NA  130*  136   POTASSIUM  4.3  4.3   CHLORIDE  97  103   PAULINE  9.4  8.5   CO2  26  24   BUN "  9  11   CR  0.48*  0.51*   GLC  84  91       Liver Function Studies -   Recent Labs   Lab Test  05/15/13   1500   PROTTOTAL  7.6   ALBUMIN  4.2   BILITOTAL  0.6   ALKPHOS  111   AST  25   ALT  23       No results found for: TSH]    Lab Results   Component Value Date    A1C 5.5 12/10/2014         ASSESSMENT/PLAN  Anterior horn cell disease (H)  Limited movement of UE/LE, has been w/c bound for a number of years.  Previously followed by neurology now wants emphasis on comfort  Cerebrovascular accident (CVA) due to other mechanism (H)  Significant deficits requiring NH placement  With physical and functional decline  Goal is to emphasize comfort with conservative treatment in NH  Neuropathy  With limited LE sensation and ambulation  On neurontin with improved comfort and function  Pain  Improved with addition of ultram and neurontin    Memory loss  Complicates nursing care and patients ability to follow and understand POC.      Orders:  Continue same poc    Total time spent with patient visit was 25 min including patient visit and review of past records. Greater than 50% of total time spent with counseling and coordinating care due to  Reg visit.       Electronically signed by:  Daya Wang CMA

## 2017-12-12 NOTE — PROGRESS NOTES
[facility]    Chief Complaint   Patient presents with     RECHECK       HPI:    Jj Castro is a 87 year old  (1/16/1930), who is being seen today for an episodic care visit at [facility] .  HPI information obtained from: facility chart records, facility staff and patient report.Today's concern is:  {FGS DX:540717}    ALLERGIES: Review of patient's allergies indicates no known allergies.  Past Medical, Surgical, Family and Social History reviewed and updated in [facility].    Current Outpatient Prescriptions   Medication Sig Dispense Refill     TRAZODONE HCL PO Take 25 mg by mouth At Bedtime And 25 mg po after 2am       Acetaminophen (TYLENOL PO) Take 650 mg by mouth 3 times daily       metoprolol (TOPROL-XL) 50 MG 24 hr tablet Take 1 tablet (50 mg) by mouth daily 90 tablet 3     fluticasone (FLONASE) 50 MCG/ACT nasal spray Spray 1 spray into both nostrils daily       triamcinolone (KENALOG) 0.1 % cream Apply topically 2 times daily as needed for irritation       menthol-zinc oxide (CALMOSEPTINE) 0.44-20.625 % OINT Apply topically as needed        SODIUM PHOSPHATES RE Place 1 enema rectally every 72 hours as needed If no BM and no results from suppository by 1200       ammonium lactate (LAC-HYDRIN) 12 % lotion Apply topically At Bedtime       hypromellose (NATURAL BALANCE TEARS) 0.4 % SOLN Place 1 drop into both eyes every hour as needed for dry eyes       bisacodyl (DULCOLAX) 10 MG suppository Place 10 mg rectally every 3 days If no BM       magnesium citrate solution Take 240 mLs by mouth See Admin Instructions       magnesium hydroxide (MILK OF MAGNESIA) 400 MG/5ML suspension Take 30 mLs by mouth daily as needed for constipation or heartburn       loratadine (CLARITIN) 10 MG tablet Take 10 mg by mouth daily as needed        TraMADol HCl (ULTRAM PO) Take 25 mg by mouth 3 times daily And 25 mg po q 3 hours prn       Gabapentin (NEURONTIN PO) Take 100 mg by mouth 2 times daily         nystatin (MYCOSTATIN) cream Apply topically 2 times daily as needed        aspirin 81 MG tablet Take 1 tablet (81 mg) by mouth daily 30 tablet      sennosides (SENOKOT) 8.6 MG tablet Take 1 tablet by mouth 2 times daily Every other day and 1 tablet BID prn       hydrocortisone (TH HYDROCORTISONE) 1 % cream Apply topically 2 times daily        Acetaminophen (TYLENOL PO) Take 650 mg by mouth 2 times daily And 325 mg every morning       olopatadine (PATANOL) 0.1 % ophthalmic solution Place 1 drop into both eyes 2 times daily as needed.       Medications reviewed:  Medications reconciled to facility chart and changes were made to reflect current medications as identified as above med list. Below are the changes that were made:   Medications stopped since last EPIC medication reconciliation:   There are no discontinued medications.    Medications started since last [facility] medication reconciliation:  No orders of the defined types were placed in this encounter.    ***    REVIEW OF SYSTEMS:  {[facility] FGS:019346}    Physical Exam:  There were no vitals taken for this visit.  {shelter physical exam :727816}    Recent Labs:  CBC RESULTS:   Recent Labs   Lab Test  10/11/17   0640  04/24/17   0700   12/10/14   0411  12/09/14   1604   WBC   --    --    --   8.6  6.9   RBC   --    --    --   4.18*  4.59   HGB  9.9*  10.8*   < >  13.3  14.5   HCT   --    --    --   38.1*  42.1   MCV   --    --    --   91  92   MCH   --    --    --   31.8  31.6   MCHC   --    --    --   34.9  34.4   RDW   --    --    --   12.9  12.3   PLT   --    --    --   214  267    < > = values in this interval not displayed.       Last Basic Metabolic Panel:  Recent Labs   Lab Test  10/11/17   0640  04/24/17   0700   NA  130*  136   POTASSIUM  4.3  4.3   CHLORIDE  97  103   PAULINE  9.4  8.5   CO2  26  24   BUN  9  11   CR  0.48*  0.51*   GLC  84  91       Liver Function Studies -   Recent Labs   Lab Test  05/15/13   1500   PROTTOTAL  7.6   ALBUMIN  4.2   BILITOTAL   0.6   ALKPHOS  111   AST  25   ALT  23       No results found for: TSH]    Lab Results   Component Value Date    A1C 5.5 12/10/2014       Assessment/Plan:  {FGS DX:359734}    Orders:  ***    {FGS TIME SPENT:988961}    Electronically signed by  Daya Wang CMA

## 2017-12-17 NOTE — PROGRESS NOTES
[facility]    Chief Complaint   Patient presents with     RECHECK       HPI:    Jj Castro is a 86 year old  (1/16/1930), who is being seen today for an episodic care visit at [facility] . Today's concern is:     Encounter Diagnoses   Name Primary?     Anterior horn cell disease (H) Yes     Neuropathy      Generalized muscle weakness      Essential hypertension      Pain        ALLERGIES: Review of patient's allergies indicates no known allergies.  Past Medical, Surgical, Family and Social History reviewed and updated in [facility].    Current Outpatient Prescriptions   Medication Sig Dispense Refill     TRAZODONE HCL PO Take 25 mg by mouth At Bedtime And 25 mg po after 2am       Acetaminophen (TYLENOL PO) Take 650 mg by mouth 3 times daily       metoprolol (TOPROL-XL) 50 MG 24 hr tablet Take 1 tablet (50 mg) by mouth daily 90 tablet 3     fluticasone (FLONASE) 50 MCG/ACT nasal spray Spray 1 spray into both nostrils daily       triamcinolone (KENALOG) 0.1 % cream Apply topically 2 times daily as needed for irritation       menthol-zinc oxide (CALMOSEPTINE) 0.44-20.625 % OINT Apply topically as needed        SODIUM PHOSPHATES RE Place 1 enema rectally every 72 hours as needed If no BM and no results from suppository by 1200       ammonium lactate (LAC-HYDRIN) 12 % lotion Apply topically At Bedtime       hypromellose (NATURAL BALANCE TEARS) 0.4 % SOLN Place 1 drop into both eyes every hour as needed for dry eyes       bisacodyl (DULCOLAX) 10 MG suppository Place 10 mg rectally every 3 days If no BM       magnesium citrate solution Take 240 mLs by mouth See Admin Instructions       magnesium hydroxide (MILK OF MAGNESIA) 400 MG/5ML suspension Take 30 mLs by mouth daily as needed for constipation or heartburn       loratadine (CLARITIN) 10 MG tablet Take 10 mg by mouth daily as needed        TraMADol HCl (ULTRAM PO) Take 25 mg by mouth 3 times daily And 25 mg po q 3 hours prn        "Gabapentin (NEURONTIN PO) Take 100 mg by mouth 2 times daily        nystatin (MYCOSTATIN) cream Apply topically 2 times daily as needed        aspirin 81 MG tablet Take 1 tablet (81 mg) by mouth daily 30 tablet      sennosides (SENOKOT) 8.6 MG tablet Take 1 tablet by mouth 2 times daily Every other day and 1 tablet BID prn       hydrocortisone (TH HYDROCORTISONE) 1 % cream Apply topically 2 times daily        Acetaminophen (TYLENOL PO) Take 650 mg by mouth 2 times daily And 325 mg every morning       olopatadine (PATANOL) 0.1 % ophthalmic solution Place 1 drop into both eyes 2 times daily as needed.       Medications reviewed:  Medications reconciled to facility chart and changes were made to reflect current medications as identified as above med list. Below are the changes that were made:   Medications stopped since last EPIC medication reconciliation:   There are no discontinued medications.    Medications started since last [facility] medication reconciliation:  No orders of the defined types were placed in this encounter.     REVIEW OF SYSTEMS:  Unobtainable secondary to cognitive impairment or aphasia. and 4 point ROS including Respiratory, CV, GI and , other than that noted in the HPI,  is negative    Physical Exam:  /64  Pulse 74  Temp 97.7  F (36.5  C)  Resp 17  Ht 5' 7\" (1.702 m)  Wt 166 lb (75.3 kg)  SpO2 95%  BMI 26 kg/m2    GENERAL APPEARANCE:  Alert, in no distress, up in w/c smiling and friendly    RESP:  respiratory effort of chest normal, auscultation of lungs clear , no respiratory distress  CV:   auscultation of heart done , rate and rhythm reg, no murmur, no peripheral edema  ABDOMEN:  normal bowel sounds, soft, nontender,  M/S:   Gait and station w/c, thin frail kyphotic right side weakness  SKIN:   pale w/d   PSYCH:  insight and judgement, memory impaired , affect and mood normal     Results for orders placed or performed in visit on 10/11/17   Basic metabolic panel   Result Value Ref " Range    Sodium 130 (L) 133 - 144 mmol/L    Potassium 4.3 3.4 - 5.3 mmol/L    Chloride 97 94 - 109 mmol/L    Carbon Dioxide 26 20 - 32 mmol/L    Anion Gap 7 3 - 14 mmol/L    Glucose 84 70 - 99 mg/dL    Urea Nitrogen 9 7 - 30 mg/dL    Creatinine 0.48 (L) 0.66 - 1.25 mg/dL    GFR Estimate >90 >60 mL/min/1.7m2    GFR Estimate If Black >90 >60 mL/min/1.7m2    Calcium 9.4 8.5 - 10.1 mg/dL   Hemoglobin   Result Value Ref Range    Hemoglobin 9.9 (L) 13.3 - 17.7 g/dL   Assessment/Plan:      Essential hypertension  Significant deficits requiring NH placement  With physical and functional decline  Stable per chart and nursing  Patient had hx htn with hypertensive CVA.   Goal is to emphasize comfort with conservative treatment in NH     BP Range:  BP Readings from Last 3 Encounters:   04/12/17 114/66   03/14/17 (!) 156/97   02/11/17 132/77   Generalized muscle weakness   Requiring extensive assistance from nursing  Up for meals only o/w spends the day resting in bed/chair  Participated with therapy   Anterior horn cell disease (H)  Limited movement of UE/LE, has been w/c bound for a number of years.  Previously followed by neurology now wants emphasis on comfort  Pain   Neuropathy   With limited LE sensation and ambulation   Managed with neurontin and tramadol     Orders:  The current medical regimen is effective;  continue present plan and medications.        Time    Total time spent with patient visit was 25 min including patient visit and review of past records. Greater than 50% of total time spent with counseling and coordinating care.    Electronically signed by  TARUN Lino CNP

## 2018-01-01 ENCOUNTER — NURSING HOME VISIT (OUTPATIENT)
Dept: GERIATRICS | Facility: CLINIC | Age: 83
End: 2018-01-01
Payer: COMMERCIAL

## 2018-01-01 VITALS
BODY MASS INDEX: 15.38 KG/M2 | SYSTOLIC BLOOD PRESSURE: 139 MMHG | HEART RATE: 77 BPM | HEIGHT: 67 IN | RESPIRATION RATE: 16 BRPM | WEIGHT: 98 LBS | OXYGEN SATURATION: 97 % | DIASTOLIC BLOOD PRESSURE: 80 MMHG | TEMPERATURE: 98 F

## 2018-01-01 VITALS
TEMPERATURE: 97.3 F | RESPIRATION RATE: 18 BRPM | HEART RATE: 68 BPM | SYSTOLIC BLOOD PRESSURE: 134 MMHG | WEIGHT: 111.8 LBS | BODY MASS INDEX: 17.51 KG/M2 | DIASTOLIC BLOOD PRESSURE: 78 MMHG

## 2018-01-01 VITALS
BODY MASS INDEX: 14.63 KG/M2 | HEIGHT: 67 IN | HEART RATE: 71 BPM | DIASTOLIC BLOOD PRESSURE: 70 MMHG | RESPIRATION RATE: 20 BRPM | OXYGEN SATURATION: 98 % | TEMPERATURE: 98 F | WEIGHT: 93.2 LBS | SYSTOLIC BLOOD PRESSURE: 118 MMHG

## 2018-01-01 VITALS
DIASTOLIC BLOOD PRESSURE: 88 MMHG | RESPIRATION RATE: 18 BRPM | BODY MASS INDEX: 16.17 KG/M2 | HEIGHT: 67 IN | WEIGHT: 103 LBS | TEMPERATURE: 98.7 F | SYSTOLIC BLOOD PRESSURE: 145 MMHG | HEART RATE: 85 BPM | OXYGEN SATURATION: 96 %

## 2018-01-01 DIAGNOSIS — R54 FRAIL ELDERLY: ICD-10-CM

## 2018-01-01 DIAGNOSIS — I63.9 CEREBROVASCULAR ACCIDENT (CVA), UNSPECIFIED MECHANISM (H): ICD-10-CM

## 2018-01-01 DIAGNOSIS — D64.9 NORMOCYTIC ANEMIA: ICD-10-CM

## 2018-01-01 DIAGNOSIS — G62.9 NEUROPATHY: ICD-10-CM

## 2018-01-01 DIAGNOSIS — J31.0 CHRONIC RHINITIS, UNSPECIFIED TYPE: ICD-10-CM

## 2018-01-01 DIAGNOSIS — M62.81 GENERALIZED MUSCLE WEAKNESS: ICD-10-CM

## 2018-01-01 DIAGNOSIS — G12.29 ANTERIOR HORN CELL DISEASE (H): Primary | ICD-10-CM

## 2018-01-01 DIAGNOSIS — R41.3 MEMORY LOSS: ICD-10-CM

## 2018-01-01 DIAGNOSIS — R52 PAIN: ICD-10-CM

## 2018-01-01 DIAGNOSIS — I10 ESSENTIAL HYPERTENSION: ICD-10-CM

## 2018-01-01 DIAGNOSIS — E87.1 HYPONATREMIA: ICD-10-CM

## 2018-01-01 DIAGNOSIS — G47.00 INSOMNIA, UNSPECIFIED TYPE: ICD-10-CM

## 2018-01-01 DIAGNOSIS — I63.89 CEREBROVASCULAR ACCIDENT (CVA) DUE TO OTHER MECHANISM (H): Primary | ICD-10-CM

## 2018-01-01 DIAGNOSIS — I69.954: ICD-10-CM

## 2018-01-01 DIAGNOSIS — E46 MALNUTRITION, UNSPECIFIED TYPE (H): ICD-10-CM

## 2018-01-01 DIAGNOSIS — G12.20 MOTOR NEURON DISEASE (H): Primary | ICD-10-CM

## 2018-01-01 DIAGNOSIS — I10 BENIGN ESSENTIAL HYPERTENSION: ICD-10-CM

## 2018-01-01 DIAGNOSIS — E46 PROTEIN-CALORIE MALNUTRITION, UNSPECIFIED SEVERITY (H): ICD-10-CM

## 2018-01-01 PROCEDURE — 99310 SBSQ NF CARE HIGH MDM 45: CPT | Performed by: FAMILY MEDICINE

## 2018-01-01 PROCEDURE — 99309 SBSQ NF CARE MODERATE MDM 30: CPT | Performed by: NURSE PRACTITIONER

## 2018-01-01 PROCEDURE — 99318 ZZC ANNUAL NURSING FAC ASSESSMNT, STABLE: CPT | Performed by: NURSE PRACTITIONER

## 2018-01-08 NOTE — PROGRESS NOTES
[facility]    Chief Complaint   Patient presents with     [facility] Regulatory       HPI:    Jj Castro is a 87 year old  (1/16/1930), who is being seen today for a federally mandated E/M visit at [facility] .  HPI information obtained from: facility chart records, facility staff, patient report and [facility] chart review.     Today's concerns are:  - Resident seen and examined.  Report has no nasal congestion or  itching.   -Reports pain is under control.   - Reports appetite and BM are fine.   -Reports cannot sleep well at night since his roommate keeps going to the bathroom every other hour at night, however generally sleeps about 6 hours a day.    - Denies numbness or tingling.  -No chest pain, headache, shortness of breath.   - RN / GNP has no concern today.   ----------------------------------  - Past Medical, social, family histories, medications, and allergies reviewed and updated  - Medications reviewed: in the chart and EHR.   - Case Management:   I have reviewed the care plan and MDS and do agree with the plan. Patient's desire to return to the community is not present.  Information reviewed:  Medications, vital signs, orders, and nursing notes.    MEDICATIONS:  Current Outpatient Prescriptions   Medication Sig Dispense Refill     TRAZODONE HCL PO Take 25 mg by mouth At Bedtime And 25 mg po after 2am       Acetaminophen (TYLENOL PO) Take 650 mg by mouth 3 times daily       metoprolol (TOPROL-XL) 50 MG 24 hr tablet Take 1 tablet (50 mg) by mouth daily 90 tablet 3     fluticasone (FLONASE) 50 MCG/ACT nasal spray Spray 1 spray into both nostrils daily       triamcinolone (KENALOG) 0.1 % cream Apply topically 2 times daily as needed for irritation       menthol-zinc oxide (CALMOSEPTINE) 0.44-20.625 % OINT Apply topically as needed        SODIUM PHOSPHATES RE Place 1 enema rectally every 72 hours as needed If no BM and no results from suppository by 1200        ammonium lactate (LAC-HYDRIN) 12 % lotion Apply topically At Bedtime       hypromellose (NATURAL BALANCE TEARS) 0.4 % SOLN Place 1 drop into both eyes every hour as needed for dry eyes       bisacodyl (DULCOLAX) 10 MG suppository Place 10 mg rectally every 3 days If no BM       magnesium citrate solution Take 240 mLs by mouth See Admin Instructions       magnesium hydroxide (MILK OF MAGNESIA) 400 MG/5ML suspension Take 30 mLs by mouth daily as needed for constipation or heartburn       loratadine (CLARITIN) 10 MG tablet Take 10 mg by mouth daily as needed        TraMADol HCl (ULTRAM PO) Take 25 mg by mouth 3 times daily And 25 mg po q 3 hours prn       Gabapentin (NEURONTIN PO) Take 100 mg by mouth 2 times daily        nystatin (MYCOSTATIN) cream Apply topically 2 times daily as needed        aspirin 81 MG tablet Take 1 tablet (81 mg) by mouth daily 30 tablet      sennosides (SENOKOT) 8.6 MG tablet Take 2 tablets by mouth daily Every other day and 1 tablet BID prn       hydrocortisone (TH HYDROCORTISONE) 1 % cream Apply topically 2 times daily        Acetaminophen (TYLENOL PO) Take 650 mg by mouth 2 times daily And 325 mg every morning       olopatadine (PATANOL) 0.1 % ophthalmic solution Place 1 drop into both eyes 2 times daily as needed.       ROS:  4 point ROS including Respiratory, CV, GI and , other than that noted in the HPI,  is negative    Exam:  Vitals: /78  Pulse 68  Temp 97.3  F (36.3  C)  Resp 18  Wt 111 lb 12.8 oz (50.7 kg)  BMI 17.51 kg/m2  BMI= Body mass index is 17.51 kg/(m^2).  GENERAL APPEARANCE: Alert, in no distress  ENT: Mouth and posterior oropharynx normal, moist mucous membranes  EYES: EOM, lids, pupils and irises normal  RESP: respiratory effort and palpation of chest normal, lungs clear to auscultation , no respiratory distress  CV: Palpation and auscultation of heart done , regular rate and rhythm, no murmur, rub, or gallop, no edema  ABDOMEN: normal bowel sounds, soft,  nontender, no hepatosplenomegaly or other masses  M/S: Gait and station abnormal Uses a walker for ambulation. No calf swelling or tenderness.   SKIN: Inspection of skin and subcutaneous tissue baseline, Palpation of skin and subcutaneous tissue baseline  NEURO: Cranial nerves 2-12 are normal tested and grossly at patient's baseline,   - Ms strength: absent b/l dorsiflexion. RLE: hip flexors 3/5, knee extension 4/5. 3/5 RUE but 0/5 right wrist (both flexion and extension),    - 5/5 Left thigh hip flexors and 4/5 left knee extensor  - LUE: 0/5 left wrist flexion, 3/5 extension. Arm flexion and extension 4/5.   - Muscles atrophy generalized.   PSYCH: oriented X 3, normal insight, judgement and memory    Lab/Diagnostic data:    CBC RESULTS:   Recent Labs   Lab Test  10/11/17   0640  04/24/17   0700   12/10/14   0411  12/09/14   1604   WBC   --    --    --   8.6  6.9   RBC   --    --    --   4.18*  4.59   HGB  9.9*  10.8*   < >  13.3  14.5   HCT   --    --    --   38.1*  42.1   MCV   --    --    --   91  92   MCH   --    --    --   31.8  31.6   MCHC   --    --    --   34.9  34.4   RDW   --    --    --   12.9  12.3   PLT   --    --    --   214  267    < > = values in this interval not displayed.       Last Basic Metabolic Panel:  Recent Labs   Lab Test  10/11/17   0640  04/24/17   0700   NA  130*  136   POTASSIUM  4.3  4.3   CHLORIDE  97  103   PAULINE  9.4  8.5   CO2  26  24   BUN  9  11   CR  0.48*  0.51*   GLC  84  91       ASSESSMENT/PLAN  Cerebrovascular accident (CVA) due to other mechanism (H)  Hemiplegia (H)  - right sided weakness, also weakness in other limbs.   - WC bound. Self propel  -Aspirin 81 mg  -Stable     Essential hypertension:  BP Readings from Last 3 Encounters:   01/08/18 134/78   12/12/17 114/64   11/09/17 116/73   -On Toprol-XL 50 mg daily, controlled.  - Keep SBP> 130 mmHg and DBP > 65 mmHg (levels below these increase mortality as shown by standard studies and observations).     Neuropathy (H)  -  with absent dorsiflexion b/l. On care and comfort plan.   -Neurontin 100 mg twice daily, controlled.  - Reduce Neurontin from 100mg bid to 100mg PO at HS.  Continue to monitor.    mild hyponatremia:  - consider BMP and follow the result    Seasonal rhinitis:  -On fluticasone scheduled.,  Loratadine 10 mg as needed controlled  - change with the To as needed, continue to monitor for worsening symptoms    Anemia, normocytic:  -Hemoglobin levels dropped 0.9 g, over a period of  six-month last year.   - MCV from 2014 is 91.  -Consider repeating CBC and if microcytic check iron levels    Insomnia;  -On trazodone, stable.    Protein-calorie malnutrition (H)  - Body mass index is 17.51 kg/(m^2).  From 17.39 kg/(m^2).  -Stable  - Partly could be due to Sarcopenia  - on prn nutrition supplement.   - Keep BMI b/w 25-35 in frail elderly.      Frail Elderly:  - Significant  Deficits requiring NH placement. Requiring extensive assistance from nursing. Up for meals only o/w spends the day resting in bed      Orders:  1. Change fluticasone nasal spray from scheduled to prn  2. Reduce Neurontin from 100mg bid to 100mg PO at HS  3. See above, otherwise, continue the rest of the current POC.       Electronically signed by:  Yuliana Feliz MD

## 2018-02-21 NOTE — PROGRESS NOTES
[place] [facility]    Chief Complaint   Patient presents with     RECHECK       HPI:    Jj Castro is a 88 year old  (1/16/1930), who is being seen today for an episodic care visit at [facility].  HPI information obtained from: facility chart records, facility staff, patient report and [facility] chart review.Today's concern is:     Motor neuron disease (H)  Cerebrovascular accident (CVA), unspecified mechanism (H)  Pain  Memory loss  Neuropathy      ALLERGIES: Review of patient's allergies indicates no known allergies.  Past Medical, Surgical, Family and Social History [facility] and [facility].    Current Outpatient Prescriptions   Medication Sig Dispense Refill     TRAZODONE HCL PO Take 25 mg by mouth At Bedtime And 25 mg po after 2am       Acetaminophen (TYLENOL PO) Take 650 mg by mouth 3 times daily       metoprolol (TOPROL-XL) 50 MG 24 hr tablet Take 1 tablet (50 mg) by mouth daily 90 tablet 3     fluticasone (FLONASE) 50 MCG/ACT nasal spray Spray 1 spray into both nostrils daily       triamcinolone (KENALOG) 0.1 % cream Apply topically 2 times daily as needed for irritation       menthol-zinc oxide (CALMOSEPTINE) 0.44-20.625 % OINT Apply topically as needed        SODIUM PHOSPHATES RE Place 1 enema rectally every 72 hours as needed If no BM and no results from suppository by 1200       ammonium lactate (LAC-HYDRIN) 12 % lotion Apply topically At Bedtime       hypromellose (NATURAL BALANCE TEARS) 0.4 % SOLN Place 1 drop into both eyes every hour as needed for dry eyes       bisacodyl (DULCOLAX) 10 MG suppository Place 10 mg rectally every 3 days If no BM       magnesium citrate solution Take 240 mLs by mouth See Admin Instructions       magnesium hydroxide (MILK OF MAGNESIA) 400 MG/5ML suspension Take 30 mLs by mouth daily as needed for constipation or heartburn       loratadine (CLARITIN) 10 MG tablet Take 10 mg by mouth daily as needed        TraMADol HCl (ULTRAM PO) Take 25 mg  "by mouth 3 times daily And 25 mg po q 3 hours prn       Gabapentin (NEURONTIN PO) Take 100 mg by mouth 2 times daily        nystatin (MYCOSTATIN) cream Apply topically 2 times daily as needed        aspirin 81 MG tablet Take 1 tablet (81 mg) by mouth daily 30 tablet      sennosides (SENOKOT) 8.6 MG tablet Take 2 tablets by mouth daily Every other day and 1 tablet BID prn       hydrocortisone (TH HYDROCORTISONE) 1 % cream Apply topically 2 times daily        Acetaminophen (TYLENOL PO) Take 650 mg by mouth 2 times daily And 325 mg every morning       olopatadine (PATANOL) 0.1 % ophthalmic solution Place 1 drop into both eyes 2 times daily as needed.       Medications reviewed:  Medications reconciled to facility chart and changes were made to reflect current medications as identified as above med list. Below are the changes that were made:   Medications stopped since last EPIC medication reconciliation:   There are no discontinued medications.    Medications started since last [facility] medication reconciliation:  No orders of the defined types were placed in this encounter.    REVIEW OF SYSTEMS:  4 point ROS including Respiratory, CV, GI and , other than that noted in the HPI,  is negative    Physical Exam:  /88  Pulse 85  Temp 98.7  F (37.1  C)  Resp 18  Ht 5' 7\" (1.702 m)  Wt 103 lb (46.7 kg)  SpO2 96%  BMI 16.13 kg/m2     GENERAL APPEARANCE:  Alert, in no distress, up in w/c smiling and friendly    RESP:  respiratory effort of chest normal, auscultation of lungs clear , no respiratory distress  CV:   auscultation of heart done , rate and rhythm reg, no murmur, no peripheral edema  ABDOMEN:  normal bowel sounds, soft, nontender,  M/S:   Gait and station w/c, thin frail kyphotic right side weakness  Bilateral contractures of wrists   SKIN:   pale w/d   PSYCH:  insight and judgement, memory impaired , affect and mood normal  Recent Labs:      CBC RESULTS:   Recent Labs   Lab Test  10/11/17   0640  " 04/24/17   0700   12/10/14   0411  12/09/14   1604   WBC   --    --    --   8.6  6.9   RBC   --    --    --   4.18*  4.59   HGB  9.9*  10.8*   < >  13.3  14.5   HCT   --    --    --   38.1*  42.1   MCV   --    --    --   91  92   MCH   --    --    --   31.8  31.6   MCHC   --    --    --   34.9  34.4   RDW   --    --    --   12.9  12.3   PLT   --    --    --   214  267    < > = values in this interval not displayed.       Last Basic Metabolic Panel:  Recent Labs   Lab Test  10/11/17   0640  04/24/17   0700   NA  130*  136   POTASSIUM  4.3  4.3   CHLORIDE  97  103   PAULINE  9.4  8.5   CO2  26  24   BUN  9  11   CR  0.48*  0.51*   GLC  84  91       Liver Function Studies -   Recent Labs   Lab Test  05/15/13   1500   PROTTOTAL  7.6   ALBUMIN  4.2   BILITOTAL  0.6   ALKPHOS  111   AST  25   ALT  23       No results found for: TSH]    Lab Results   Component Value Date    A1C 5.5 12/10/2014     Assessment/Plan:    Anterior horn cell disease (H)  Limited movement of UE/LE, has been w/c bound for a number of years.  Previously followed by neurology now wants emphasis on comfort  Cerebrovascular accident (CVA) due to other mechanism (H)  Significant deficits requiring NH placement  With physical and functional decline  Goal is to emphasize comfort with conservative treatment in NH  Neuropathy  With limited LE sensation and ambulation  On neurontin with improved comfort and function  Pain  Improved with addition of ultram and neurontin    Memory loss  Complicates nursing care and patients ability to follow and understand POC.    Orders:  The current medical regimen is effective;  continue present plan and medications.      Total time spent with patient visit at the skilled nursing facility was 25 min including patient visit and review of past records. Greater than 50% of total time spent with counseling and coordinating care due to recheck    Electronically signed by  TARUN Lino CNP

## 2018-03-13 NOTE — PROGRESS NOTES
[place] [facility]    Chief Complaint   Patient presents with     [facility] Regulatory       HPI:    Jj Castro is a 88 year old  (1/16/1930), who is being seen today for a federally mandated E/M visit at [facility].  HPI information obtained from: facility chart records, facility staff, patient report and [facility] chart review. Today's concerns are:     Anterior horn cell disease (H)  Generalized muscle weakness  Cerebrovascular accident (CVA), unspecified mechanism (H)  Memory loss  Neuropathy      ALLERGIES: Review of patient's allergies indicates no known allergies.  PAST MEDICAL HISTORY:  has a past medical history of Annual physical exam  8/12/13 (8/12/2013); Anorexia; At risk for falling (7/8/2013); Coronary artery disease; CVA (cerebral infarction); Difficulty in walking(719.7) (7/8/2013); Generalized weakness (7/8/2013); Hypertension; Intracranial bleed (H); Lack of appetite (7/8/2013); Neuropathy; Other motor neuron diseases (1992); Other peripheral vascular disease(443.89); Tobacco use disorder; and Tobacco use disorder. He also has no past medical history of Basal cell carcinoma; Malignant melanoma (H); or Squamous cell carcinoma.  PAST SURGICAL HISTORY:  has a past surgical history that includes surgical history of -  and surgical history of -  (6/21/2000).  FAMILY HISTORY: family history includes Alcohol/Drug in his father; DIABETES in his maternal grandmother.  SOCIAL HISTORY:  reports that he has quit smoking. His smoking use included Cigarettes. He quit after 55.00 years of use. He has never used smokeless tobacco. He reports that he does not drink alcohol or use illicit drugs.    MEDICATIONS:  Current Outpatient Prescriptions   Medication Sig Dispense Refill     TRAZODONE HCL PO Take 25 mg by mouth At Bedtime And 25 mg po after 2am       Acetaminophen (TYLENOL PO) Take 650 mg by mouth 3 times daily       metoprolol (TOPROL-XL) 50 MG 24 hr tablet Take 1 tablet (50  mg) by mouth daily 90 tablet 3     fluticasone (FLONASE) 50 MCG/ACT nasal spray Spray 1 spray into both nostrils daily       triamcinolone (KENALOG) 0.1 % cream Apply topically 2 times daily as needed for irritation       menthol-zinc oxide (CALMOSEPTINE) 0.44-20.625 % OINT Apply topically as needed        SODIUM PHOSPHATES RE Place 1 enema rectally every 72 hours as needed If no BM and no results from suppository by 1200       ammonium lactate (LAC-HYDRIN) 12 % lotion Apply topically At Bedtime       hypromellose (NATURAL BALANCE TEARS) 0.4 % SOLN Place 1 drop into both eyes every hour as needed for dry eyes       bisacodyl (DULCOLAX) 10 MG suppository Place 10 mg rectally every 3 days If no BM       magnesium citrate solution Take 240 mLs by mouth See Admin Instructions       magnesium hydroxide (MILK OF MAGNESIA) 400 MG/5ML suspension Take 30 mLs by mouth daily as needed for constipation or heartburn       loratadine (CLARITIN) 10 MG tablet Take 10 mg by mouth daily as needed        TraMADol HCl (ULTRAM PO) Take 25 mg by mouth 3 times daily And 25 mg po q 3 hours prn       Gabapentin (NEURONTIN PO) Take 100 mg by mouth 2 times daily        nystatin (MYCOSTATIN) cream Apply topically 2 times daily as needed        aspirin 81 MG tablet Take 1 tablet (81 mg) by mouth daily 30 tablet      sennosides (SENOKOT) 8.6 MG tablet Take 2 tablets by mouth daily Every other day and 1 tablet BID prn       hydrocortisone (TH HYDROCORTISONE) 1 % cream Apply topically 2 times daily        Acetaminophen (TYLENOL PO) Take 650 mg by mouth 2 times daily And 325 mg every morning       Medications reviewed:  Medications reconciled to facility chart and changes were made to reflect current medications as identified as above med list. Below are the changes that were made:   Medications stopped since last EPIC medication reconciliation:   There are no discontinued medications.    Medications started since last EPIC medication  "reconciliation:  No orders of the defined types were placed in this encounter.         Case Management:  I have reviewed the care plan and MDS and do agree with the plan. Patient's desire to return to the community is present, but is not able due to care needs .  Information reviewed:  Medications, vital signs, orders, and nursing notes.    ROS:  4 point ROS including Respiratory, CV, GI and , other than that noted in the HPI,  is negative    Exam:  Vitals: /80  Pulse 77  Temp 98  F (36.7  C)  Resp 16  Ht 5' 7\" (1.702 m)  Wt 98 lb (44.5 kg)  SpO2 97%  BMI 15.35 kg/m2  BMI= Body mass index is 15.35 kg/(m^2).  GENERAL APPEARANCE:  Alert, in no distress, up in w/c smiling and friendly    RESP:  respiratory effort of chest normal, auscultation of lungs clear , no respiratory distress  CV:   auscultation of heart done , rate and rhythm reg, no murmur, no peripheral edema  ABDOMEN:  normal bowel sounds, soft, nontender,  M/S:   Gait and station w/c, thin frail kyphotic right side weakness  Bilateral contractures of wrists   SKIN:   pale w/d   PSYCH:  insight and judgement, memory impaired , affect and mood normal    Lab/Diagnostic data:      CBC RESULTS:   Recent Labs   Lab Test  10/11/17   0640  04/24/17   0700   12/10/14   0411  12/09/14   1604   WBC   --    --    --   8.6  6.9   RBC   --    --    --   4.18*  4.59   HGB  9.9*  10.8*   < >  13.3  14.5   HCT   --    --    --   38.1*  42.1   MCV   --    --    --   91  92   MCH   --    --    --   31.8  31.6   MCHC   --    --    --   34.9  34.4   RDW   --    --    --   12.9  12.3   PLT   --    --    --   214  267    < > = values in this interval not displayed.       Last Basic Metabolic Panel:  Recent Labs   Lab Test  10/11/17   0640  04/24/17   0700   NA  130*  136   POTASSIUM  4.3  4.3   CHLORIDE  97  103   PAULINE  9.4  8.5   CO2  26  24   BUN  9  11   CR  0.48*  0.51*   GLC  84  91       Liver Function Studies -   Recent Labs   Lab Test  05/15/13   1500 "   PROTTOTAL  7.6   ALBUMIN  4.2   BILITOTAL  0.6   ALKPHOS  111   AST  25   ALT  23       No results found for: TSH]    Lab Results   Component Value Date    A1C 5.5 12/10/2014       ASSESSMENT/PLAN    Anterior horn cell disease (H)  Limited movement of UE/LE, has been w/c bound for a number of years.  Previously followed by neurology now wants emphasis on comfort  Cerebrovascular accident (CVA) due to other mechanism (H)  Significant deficits requiring NH placement  With physical and functional decline  Goal is to emphasize comfort with conservative treatment in NH  Neuropathy  With limited LE sensation and ambulation  On neurontin with improved comfort and function  Pain  Improved with addition of ultram and neurontin    Memory loss  Complicates nursing care and patients ability to follow and understand POC.    Orders:  The current medical regimen is effective;  continue present plan and medications.      Total time spent with patient visit at the skilled nursing facility was 25 min including patient visit and review of past records. Greater than 50% of total time spent with counseling and coordinating care due to reg    Electronically signed by:  TARUN Lino CNP

## 2018-04-04 NOTE — PROGRESS NOTES
[facility]  Chief Complaint   Patient presents with     Annual Comprehensive Nursing Home       HPI:    Jj Castro is a 88 year old  (1/16/1930), who is being seen today for an annual comprehensive visit at [facility] .  HPI information obtained from: facility chart records, facility staff, patient report and [facility] chart review.  Today's concerns are:     Anterior horn cell disease (H)  Hemiplegia (H)  Malnutrition, unspecified type (H)  Benign essential hypertension        ALLERGIES: Review of patient's allergies indicates no known allergies.  PROBLEM LIST:  Patient Active Problem List   Diagnosis     BPH (benign prostatic hyperplasia)     Debility     Multifocal motor neuropathy (H)     Difficulty walking     Generalized weakness     Essential hypertension     Anterior horn cell disease (H)      ANNUAL 4/3/18     Memory loss     Stroke (H)     Neuropathy     Bilateral hearing loss     Advance Care Planning     Protein-calorie malnutrition (H)     PAST MEDICAL HISTORY:  has a past medical history of Annual physical exam  8/12/13 (8/12/2013); Anorexia; At risk for falling (7/8/2013); Coronary artery disease; CVA (cerebral infarction); Difficulty in walking(719.7) (7/8/2013); Generalized weakness (7/8/2013); Hypertension; Intracranial bleed (H); Lack of appetite (7/8/2013); Neuropathy; Other motor neuron diseases (1992); Other peripheral vascular disease(443.89); Tobacco use disorder; and Tobacco use disorder. He also has no past medical history of Basal cell carcinoma; Malignant melanoma (H); or Squamous cell carcinoma.  PAST SURGICAL HISTORY:  has a past surgical history that includes surgical history of -  and surgical history of -  (6/21/2000).  FAMILY HISTORY: family history includes Alcohol/Drug in his father; DIABETES in his maternal grandmother.  SOCIAL HISTORY:  reports that he has quit smoking. His smoking use included Cigarettes. He quit after 55.00 years of  use. He has never used smokeless tobacco. He reports that he does not drink alcohol or use illicit drugs.  IMMUNIZATIONS:  Most Recent Immunizations   Administered Date(s) Administered     Pneumococcal 23 valent 10/02/2003   Deferred Date(s) Deferred     Influenza (High Dose) 3 valent vaccine 11/03/2016     Influenza (IIV3) PF 11/03/2016     Pneumo Conj 13-V (2010&after) 11/03/2016     Pneumococcal 23 valent 11/03/2016     TDAP Vaccine (Adacel) 11/03/2016     Above immunizations pulled from [facility]. [facility] and facility records also reconciled. Outstanding information sent to  to update [facility]  .  Future immunizations needed:  yearly influenza per facility protocol  MEDICATIONS:  Current Outpatient Prescriptions   Medication Sig Dispense Refill     alum & mag hydroxide-simethicone 80 mL, diphenhydrAMINE 80 mL, lidocaine (viscous) 80 mL magic mouthwash Take 10 mLs by mouth 2 times daily       Nutritional Supplements (ENSURE ACTIVE HIGH PROTEIN PO) Take 8 oz by mouth 3 times daily       TRAZODONE HCL PO Take 25 mg by mouth At Bedtime And 25 mg po after 2am       Acetaminophen (TYLENOL PO) Take 650 mg by mouth 3 times daily       metoprolol (TOPROL-XL) 50 MG 24 hr tablet Take 1 tablet (50 mg) by mouth daily 90 tablet 3     fluticasone (FLONASE) 50 MCG/ACT nasal spray Spray 1 spray into both nostrils daily       triamcinolone (KENALOG) 0.1 % cream Apply topically 2 times daily as needed for irritation       menthol-zinc oxide (CALMOSEPTINE) 0.44-20.625 % OINT Apply topically as needed        SODIUM PHOSPHATES RE Place 1 enema rectally every 72 hours as needed If no BM and no results from suppository by 1200       ammonium lactate (LAC-HYDRIN) 12 % lotion Apply topically At Bedtime       hypromellose (NATURAL BALANCE TEARS) 0.4 % SOLN Place 1 drop into both eyes every hour as needed for dry eyes       bisacodyl (DULCOLAX) 10 MG suppository Place 10 mg rectally every 3 days If no BM        magnesium citrate solution Take 240 mLs by mouth See Admin Instructions       magnesium hydroxide (MILK OF MAGNESIA) 400 MG/5ML suspension Take 30 mLs by mouth daily as needed for constipation or heartburn       loratadine (CLARITIN) 10 MG tablet Take 10 mg by mouth daily as needed        TraMADol HCl (ULTRAM PO) Take 25 mg by mouth 3 times daily And 25 mg po q 3 hours prn       Gabapentin (NEURONTIN PO) Take 100 mg by mouth 2 times daily        nystatin (MYCOSTATIN) cream Apply topically 2 times daily as needed        aspirin 81 MG tablet Take 1 tablet (81 mg) by mouth daily 30 tablet      sennosides (SENOKOT) 8.6 MG tablet Take 2 tablets by mouth daily Every other day and 1 tablet BID prn       hydrocortisone (TH HYDROCORTISONE) 1 % cream Apply topically 2 times daily        Acetaminophen (TYLENOL PO) Take 650 mg by mouth 2 times daily And 325 mg every morning       Medications reviewed:  Medications reconciled to facility chart and changes were made to reflect current medications as identified as above med list. Below are the changes that were made:   Medications stopped since last EPIC medication reconciliation:   There are no discontinued medications.    Medications started since last [facility] medication reconciliation:  No orders of the defined types were placed in this encounter.    c  Case Management:  I have reviewed the facility/SNF care plan/MDS which was done 4/3/18, including the falls risk, nutrition and pain screening. I also reviewed the current immunizations, and preventive care..Future cancer screening is not clinically indicated secondary to age/goals of care Patient's desire to return to the community is present, but is not able due to care needs . Current Level of Care is appropriate.     Advance Directive Discussion:    I reviewed the current advanced directives as reflected in EPIC, the POLST and the facility chart, and verified the congruency of orders  . I contacted the first party Cheko Castro  "and discussed the plan of Care.  I did not due to cognitive impairment review the advance directives with the resident.     Team Discussion:  I communicated with the appropriate disciplines involved with the Plan of Care:   Nursing  ,    and Dietitian      Patient Goal:  Patient's goal is pain control and comfort.    Information reviewed:  Medications, vital signs, orders, and nursing notes.    ROS:  10 point ROS of systems including Constitutional, Eyes, Respiratory, Cardiovascular, Gastroenterology, Genitourinary, Integumentary, Muscularskeletal, Psychiatric were all negative except for pertinent positives noted in my HPI.    Exam:  /70  Pulse 71  Temp 98  F (36.7  C)  Resp 20  Ht 5' 7\" (1.702 m)  Wt 93 lb 3.2 oz (42.3 kg)  SpO2 98%  BMI 14.6 kg/m2   GENERAL APPEARANCE:  Alert, in no distress, very thin  ENT:  [place], oral mucous membranes moist  EYES:  EOM normal, conjunctiva and lids normal  NECK:  No adenopathy,masses or thyromegaly  RESP:  lungs clear to auscultation , no respiratory distress, diminished breath sounds bases  CV:  regular rate and rhythm, no murmur   ABDOMEN:  bowel sounds normal, soft, non-tender  M/S:   Gait and station abnormal w/c bound  thin frail kyphotic  Bilateral wrist contractures  SKIN:  pale w/d   PSYCH:  oriented to self, insight and judgement impaired, memory impaired , affect and mood normal       Lab/Diagnostic data:       CBC RESULTS:   Recent Labs   Lab Test  10/11/17   0640  04/24/17   0700   12/10/14   0411  12/09/14   1604   WBC   --    --    --   8.6  6.9   RBC   --    --    --   4.18*  4.59   HGB  9.9*  10.8*   < >  13.3  14.5   HCT   --    --    --   38.1*  42.1   MCV   --    --    --   91  92   MCH   --    --    --   31.8  31.6   MCHC   --    --    --   34.9  34.4   RDW   --    --    --   12.9  12.3   PLT   --    --    --   214  267    < > = values in this interval not displayed.       Last Basic Metabolic Panel:  Recent Labs   Lab Test  " "10/11/17   0640  04/24/17   0700   NA  130*  136   POTASSIUM  4.3  4.3   CHLORIDE  97  103   PAULINE  9.4  8.5   CO2  26  24   BUN  9  11   CR  0.48*  0.51*   GLC  84  91       Liver Function Studies -   Recent Labs   Lab Test  05/15/13   1500   PROTTOTAL  7.6   ALBUMIN  4.2   BILITOTAL  0.6   ALKPHOS  111   AST  25   ALT  23       No results found for: TSH]    Lab Results   Component Value Date    A1C 5.5 12/10/2014         ASSESSMENT/PLAN    information obtained from: facility chart records, facility staff, patient report and [facility] chart review.  Today's concerns are:  Anterior horn cell disease (H)  Limited movement of UE/LE, has been w/c bound for a number of years.  Previously followed by neurology now wants emphasis on comfort  Cerebrovascular accident (CVA) due to other mechanism (H)  Significant deficits requiring NH placement  With physical and functional decline  Goal is to emphasize comfort with conservative treatment in NH  Neuropathy  With limited LE sensation and ambulation  On neurontin with improved comfort and function  Malnutrition, unspecified type (H)  Body mass index is 14.6 kg/(m^2).  Fair to good po intake per patient but staff reports patient refuses food most of the time at meals.  Patient reports \"I eat what I can\"  And \"food upsets my stomach\"  Discussed options with staff and patient   Followed by dietician and po intake supervised by nursing    Essential hypertension  Variable  Denies CP/SOB, HA  Based on JNC-8 goals,  patients age of 88 year old, no presence of diabetes or CKD, and goals of care goal BP is  <140/90 mm Hg. Patient is stable with current plan of care and routine assessment..  BP Readings from Last 3 Encounters:   04/03/18 118/70   03/13/18 139/80   02/21/18 145/88       Orders:  1.  Magic cup bid.  2.  Ensure 8 oz tid prn.  3.  Hospice referral.    Electronically signed by:  TARUN Lino CNP      "

## 2024-06-17 PROBLEM — Z71.89 ADVANCED DIRECTIVES, COUNSELING/DISCUSSION: Status: RESOLVED | Noted: 2017-03-23 | Resolved: 2024-06-17
